# Patient Record
Sex: MALE | Race: WHITE | NOT HISPANIC OR LATINO | Employment: FULL TIME | URBAN - METROPOLITAN AREA
[De-identification: names, ages, dates, MRNs, and addresses within clinical notes are randomized per-mention and may not be internally consistent; named-entity substitution may affect disease eponyms.]

---

## 2019-08-30 ENCOUNTER — TRANSCRIBE ORDERS (OUTPATIENT)
Dept: ADMINISTRATIVE | Facility: HOSPITAL | Age: 44
End: 2019-08-30

## 2019-08-30 DIAGNOSIS — I15.8 SECONDARY DIASTOLIC HYPERTENSION: Primary | ICD-10-CM

## 2019-08-30 DIAGNOSIS — R06.02 SHORTNESS OF BREATH: ICD-10-CM

## 2019-09-27 ENCOUNTER — APPOINTMENT (OUTPATIENT)
Dept: CARDIOLOGY | Facility: HOSPITAL | Age: 44
End: 2019-09-27

## 2019-10-14 ENCOUNTER — HOSPITAL ENCOUNTER (OUTPATIENT)
Dept: CARDIOLOGY | Facility: HOSPITAL | Age: 44
Discharge: HOME | End: 2019-10-14
Admitting: FAMILY MEDICINE

## 2019-10-14 VITALS
BODY MASS INDEX: 34.8 KG/M2 | SYSTOLIC BLOOD PRESSURE: 128 MMHG | HEIGHT: 69 IN | DIASTOLIC BLOOD PRESSURE: 91 MMHG | WEIGHT: 235 LBS | HEART RATE: 74 BPM

## 2019-10-14 DIAGNOSIS — I15.8 SECONDARY DIASTOLIC HYPERTENSION: ICD-10-CM

## 2019-10-14 DIAGNOSIS — R06.02 SHORTNESS OF BREATH: ICD-10-CM

## 2019-10-14 LAB
AORTIC DIMENSIONLESS INDEX: 0.9 (DI)
BH CV ECHO MEAS - ACS: 2.3 CM
BH CV ECHO MEAS - AO MAX PG (FULL): 0.56 MMHG
BH CV ECHO MEAS - AO MAX PG: 4 MMHG
BH CV ECHO MEAS - AO MEAN PG (FULL): 1 MMHG
BH CV ECHO MEAS - AO MEAN PG: 2 MMHG
BH CV ECHO MEAS - AO ROOT AREA (BSA CORRECTED): 1.5
BH CV ECHO MEAS - AO ROOT AREA: 8.6 CM^2
BH CV ECHO MEAS - AO ROOT DIAM: 3.3 CM
BH CV ECHO MEAS - AO V2 MAX: 100 CM/SEC
BH CV ECHO MEAS - AO V2 MEAN: 67.7 CM/SEC
BH CV ECHO MEAS - AO V2 VTI: 18.5 CM
BH CV ECHO MEAS - ASC AORTA: 3.3 CM
BH CV ECHO MEAS - AVA(I,A): 3.5 CM^2
BH CV ECHO MEAS - AVA(I,D): 3.5 CM^2
BH CV ECHO MEAS - AVA(V,A): 3.5 CM^2
BH CV ECHO MEAS - AVA(V,D): 3.5 CM^2
BH CV ECHO MEAS - BSA(HAYCOCK): 2.3 M^2
BH CV ECHO MEAS - BSA: 2.2 M^2
BH CV ECHO MEAS - BZI_BMI: 34.7 KILOGRAMS/M^2
BH CV ECHO MEAS - BZI_METRIC_HEIGHT: 175.3 CM
BH CV ECHO MEAS - BZI_METRIC_WEIGHT: 106.6 KG
BH CV ECHO MEAS - EDV(CUBED): 91.1 ML
BH CV ECHO MEAS - EDV(MOD-SP2): 116 ML
BH CV ECHO MEAS - EDV(MOD-SP4): 150 ML
BH CV ECHO MEAS - EDV(TEICH): 92.4 ML
BH CV ECHO MEAS - EF(CUBED): 70.4 %
BH CV ECHO MEAS - EF(MOD-BP): 54 %
BH CV ECHO MEAS - EF(MOD-SP2): 56 %
BH CV ECHO MEAS - EF(MOD-SP4): 53.3 %
BH CV ECHO MEAS - EF(TEICH): 62.1 %
BH CV ECHO MEAS - ESV(CUBED): 27 ML
BH CV ECHO MEAS - ESV(MOD-SP2): 51 ML
BH CV ECHO MEAS - ESV(MOD-SP4): 70 ML
BH CV ECHO MEAS - ESV(TEICH): 35 ML
BH CV ECHO MEAS - FS: 33.3 %
BH CV ECHO MEAS - IVS/LVPW: 1.1
BH CV ECHO MEAS - IVSD: 1.2 CM
BH CV ECHO MEAS - LAT PEAK E' VEL: 12.1 CM/SEC
BH CV ECHO MEAS - LV DIASTOLIC VOL/BSA (35-75): 67.8 ML/M^2
BH CV ECHO MEAS - LV MASS(C)D: 180.7 GRAMS
BH CV ECHO MEAS - LV MASS(C)DI: 81.7 GRAMS/M^2
BH CV ECHO MEAS - LV MAX PG: 3.4 MMHG
BH CV ECHO MEAS - LV MEAN PG: 1 MMHG
BH CV ECHO MEAS - LV SYSTOLIC VOL/BSA (12-30): 31.6 ML/M^2
BH CV ECHO MEAS - LV V1 MAX: 92.7 CM/SEC
BH CV ECHO MEAS - LV V1 MEAN: 51 CM/SEC
BH CV ECHO MEAS - LV V1 VTI: 17.2 CM
BH CV ECHO MEAS - LVIDD: 4.5 CM
BH CV ECHO MEAS - LVIDS: 3 CM
BH CV ECHO MEAS - LVLD AP2: 9.1 CM
BH CV ECHO MEAS - LVLD AP4: 9.5 CM
BH CV ECHO MEAS - LVLS AP2: 8 CM
BH CV ECHO MEAS - LVLS AP4: 8.4 CM
BH CV ECHO MEAS - LVOT AREA (M): 3.8 CM^2
BH CV ECHO MEAS - LVOT AREA: 3.8 CM^2
BH CV ECHO MEAS - LVOT DIAM: 2.2 CM
BH CV ECHO MEAS - LVPWD: 1.1 CM
BH CV ECHO MEAS - MED PEAK E' VEL: 7.62 CM/SEC
BH CV ECHO MEAS - MV A DUR: 162 SEC
BH CV ECHO MEAS - MV A MAX VEL: 57.8 CM/SEC
BH CV ECHO MEAS - MV DEC SLOPE: 351 CM/SEC^2
BH CV ECHO MEAS - MV DEC TIME: 109 SEC
BH CV ECHO MEAS - MV E MAX VEL: 47.7 CM/SEC
BH CV ECHO MEAS - MV E/A: 0.83
BH CV ECHO MEAS - MV MAX PG: 2.1 MMHG
BH CV ECHO MEAS - MV MEAN PG: 1 MMHG
BH CV ECHO MEAS - MV P1/2T MAX VEL: 61.3 CM/SEC
BH CV ECHO MEAS - MV P1/2T: 51.2 MSEC
BH CV ECHO MEAS - MV V2 MAX: 71.8 CM/SEC
BH CV ECHO MEAS - MV V2 MEAN: 42.6 CM/SEC
BH CV ECHO MEAS - MV V2 VTI: 17 CM
BH CV ECHO MEAS - MVA P1/2T LCG: 3.6 CM^2
BH CV ECHO MEAS - MVA(P1/2T): 4.3 CM^2
BH CV ECHO MEAS - MVA(VTI): 3.8 CM^2
BH CV ECHO MEAS - PA ACC TIME: 0.12 SEC
BH CV ECHO MEAS - PA MAX PG (FULL): 8.4 MMHG
BH CV ECHO MEAS - PA MAX PG: 9.9 MMHG
BH CV ECHO MEAS - PA PR(ACCEL): 25 MMHG
BH CV ECHO MEAS - PA V2 MAX: 157 CM/SEC
BH CV ECHO MEAS - PVA(V,A): 1.8 CM^2
BH CV ECHO MEAS - PVA(V,D): 1.8 CM^2
BH CV ECHO MEAS - QP/QS: 0.96
BH CV ECHO MEAS - RV MAX PG: 1.5 MMHG
BH CV ECHO MEAS - RV MEAN PG: 1 MMHG
BH CV ECHO MEAS - RV V1 MAX: 61.3 CM/SEC
BH CV ECHO MEAS - RV V1 MEAN: 39.6 CM/SEC
BH CV ECHO MEAS - RV V1 VTI: 13.9 CM
BH CV ECHO MEAS - RVOT AREA: 4.5 CM^2
BH CV ECHO MEAS - RVOT DIAM: 2.4 CM
BH CV ECHO MEAS - SI(AO): 71.5 ML/M^2
BH CV ECHO MEAS - SI(CUBED): 29 ML/M^2
BH CV ECHO MEAS - SI(LVOT): 29.6 ML/M^2
BH CV ECHO MEAS - SI(MOD-SP2): 29.4 ML/M^2
BH CV ECHO MEAS - SI(MOD-SP4): 36.2 ML/M^2
BH CV ECHO MEAS - SI(TEICH): 26 ML/M^2
BH CV ECHO MEAS - SV(AO): 158.2 ML
BH CV ECHO MEAS - SV(CUBED): 64.1 ML
BH CV ECHO MEAS - SV(LVOT): 65.4 ML
BH CV ECHO MEAS - SV(MOD-SP2): 65 ML
BH CV ECHO MEAS - SV(MOD-SP4): 80 ML
BH CV ECHO MEAS - SV(RVOT): 62.9 ML
BH CV ECHO MEAS - SV(TEICH): 57.4 ML
BH CV ECHO MEAS - TAPSE (>1.6): 2.7 CM2
BH CV ECHO MEASUREMENTS AVERAGE E/E' RATIO: 4.84
BH CV XLRA - RV BASE: 3.9 CM
BH CV XLRA - TDI S': 12.7 CM/SEC
LEFT ATRIUM VOLUME INDEX: 21 ML/M2
LV EF 2D ECHO EST: 54 %

## 2019-10-14 PROCEDURE — 25010000002 PERFLUTREN (DEFINITY) 8.476 MG IN SODIUM CHLORIDE 0.9 % 10 ML INJECTION: Performed by: FAMILY MEDICINE

## 2019-10-14 PROCEDURE — 93306 TTE W/DOPPLER COMPLETE: CPT | Performed by: INTERNAL MEDICINE

## 2019-10-14 PROCEDURE — 93306 TTE W/DOPPLER COMPLETE: CPT

## 2019-10-14 RX ADMIN — SODIUM CHLORIDE 2 ML: 9 INJECTION INTRAMUSCULAR; INTRAVENOUS; SUBCUTANEOUS at 11:03

## 2020-11-27 ENCOUNTER — TRANSCRIBE ORDERS (OUTPATIENT)
Dept: ADMINISTRATIVE | Facility: HOSPITAL | Age: 45
End: 2020-11-27

## 2020-11-27 DIAGNOSIS — I10 ESSENTIAL HYPERTENSION, MALIGNANT: ICD-10-CM

## 2020-11-27 DIAGNOSIS — R79.82 ELEVATED C-REACTIVE PROTEIN (CRP): Primary | ICD-10-CM

## 2020-12-16 ENCOUNTER — HOSPITAL ENCOUNTER (OUTPATIENT)
Dept: CT IMAGING | Facility: HOSPITAL | Age: 45
Discharge: HOME | End: 2020-12-16
Admitting: FAMILY MEDICINE

## 2020-12-16 DIAGNOSIS — I10 ESSENTIAL HYPERTENSION, MALIGNANT: ICD-10-CM

## 2020-12-16 DIAGNOSIS — R79.82 ELEVATED C-REACTIVE PROTEIN (CRP): ICD-10-CM

## 2020-12-16 PROCEDURE — 75571 CT HRT W/O DYE W/CA TEST: CPT

## 2021-07-30 ENCOUNTER — TRANSCRIBE ORDERS (OUTPATIENT)
Dept: ADMINISTRATIVE | Facility: HOSPITAL | Age: 46
End: 2021-07-30

## 2021-07-30 ENCOUNTER — HOSPITAL ENCOUNTER (OUTPATIENT)
Dept: CARDIOLOGY | Facility: HOSPITAL | Age: 46
Discharge: HOME | End: 2021-07-30
Admitting: PHYSICIAN ASSISTANT

## 2021-07-30 ENCOUNTER — HOSPITAL ENCOUNTER (EMERGENCY)
Facility: HOSPITAL | Age: 46
Discharge: HOME | End: 2021-07-30
Attending: EMERGENCY MEDICINE | Admitting: EMERGENCY MEDICINE

## 2021-07-30 VITALS
OXYGEN SATURATION: 97 % | SYSTOLIC BLOOD PRESSURE: 135 MMHG | RESPIRATION RATE: 18 BRPM | BODY MASS INDEX: 34.7 KG/M2 | TEMPERATURE: 96.9 F | DIASTOLIC BLOOD PRESSURE: 85 MMHG | HEART RATE: 72 BPM | HEIGHT: 69 IN

## 2021-07-30 DIAGNOSIS — R09.89 SUSPECTED DVT (DEEP VEIN THROMBOSIS): Primary | ICD-10-CM

## 2021-07-30 DIAGNOSIS — R09.89 SUSPECTED DVT (DEEP VEIN THROMBOSIS): ICD-10-CM

## 2021-07-30 DIAGNOSIS — I82.401 ACUTE DEEP VEIN THROMBOSIS (DVT) OF RIGHT LOWER EXTREMITY, UNSPECIFIED VEIN (HCC): Primary | ICD-10-CM

## 2021-07-30 LAB
ANION GAP SERPL CALCULATED.3IONS-SCNC: 8.2 MMOL/L (ref 5–15)
BASOPHILS # BLD AUTO: 0.04 10*3/MM3 (ref 0–0.2)
BASOPHILS NFR BLD AUTO: 0.5 % (ref 0–1.5)
BUN SERPL-MCNC: 25 MG/DL (ref 6–20)
BUN/CREAT SERPL: 21.7 (ref 7–25)
CALCIUM SPEC-SCNC: 8.8 MG/DL (ref 8.6–10.5)
CHLORIDE SERPL-SCNC: 105 MMOL/L (ref 98–107)
CO2 SERPL-SCNC: 25.8 MMOL/L (ref 22–29)
CREAT SERPL-MCNC: 1.15 MG/DL (ref 0.76–1.27)
DEPRECATED RDW RBC AUTO: 38.6 FL (ref 37–54)
EOSINOPHIL # BLD AUTO: 0.03 10*3/MM3 (ref 0–0.4)
EOSINOPHIL NFR BLD AUTO: 0.4 % (ref 0.3–6.2)
ERYTHROCYTE [DISTWIDTH] IN BLOOD BY AUTOMATED COUNT: 13 % (ref 12.3–15.4)
GFR SERPL CREATININE-BSD FRML MDRD: 68 ML/MIN/1.73
GLUCOSE SERPL-MCNC: 86 MG/DL (ref 65–99)
HCT VFR BLD AUTO: 47.9 % (ref 37.5–51)
HGB BLD-MCNC: 16 G/DL (ref 13–17.7)
IMM GRANULOCYTES # BLD AUTO: 0.02 10*3/MM3 (ref 0–0.05)
IMM GRANULOCYTES NFR BLD AUTO: 0.3 % (ref 0–0.5)
LYMPHOCYTES # BLD AUTO: 1.44 10*3/MM3 (ref 0.7–3.1)
LYMPHOCYTES NFR BLD AUTO: 18.2 % (ref 19.6–45.3)
MCH RBC QN AUTO: 27.7 PG (ref 26.6–33)
MCHC RBC AUTO-ENTMCNC: 33.4 G/DL (ref 31.5–35.7)
MCV RBC AUTO: 82.9 FL (ref 79–97)
MONOCYTES # BLD AUTO: 0.56 10*3/MM3 (ref 0.1–0.9)
MONOCYTES NFR BLD AUTO: 7.1 % (ref 5–12)
NEUTROPHILS NFR BLD AUTO: 5.83 10*3/MM3 (ref 1.7–7)
NEUTROPHILS NFR BLD AUTO: 73.5 % (ref 42.7–76)
NRBC BLD AUTO-RTO: 0 /100 WBC (ref 0–0.2)
PLATELET # BLD AUTO: 217 10*3/MM3 (ref 140–450)
PMV BLD AUTO: 10.4 FL (ref 6–12)
POTASSIUM SERPL-SCNC: 4.5 MMOL/L (ref 3.5–5.2)
RBC # BLD AUTO: 5.78 10*6/MM3 (ref 4.14–5.8)
SODIUM SERPL-SCNC: 139 MMOL/L (ref 136–145)
WBC # BLD AUTO: 7.92 10*3/MM3 (ref 3.4–10.8)

## 2021-07-30 PROCEDURE — 99283 EMERGENCY DEPT VISIT LOW MDM: CPT

## 2021-07-30 PROCEDURE — 93971 EXTREMITY STUDY: CPT

## 2021-07-30 PROCEDURE — 36415 COLL VENOUS BLD VENIPUNCTURE: CPT

## 2021-07-30 PROCEDURE — 80048 BASIC METABOLIC PNL TOTAL CA: CPT | Performed by: EMERGENCY MEDICINE

## 2021-07-30 PROCEDURE — 85025 COMPLETE CBC W/AUTO DIFF WBC: CPT | Performed by: EMERGENCY MEDICINE

## 2021-07-30 RX ADMIN — APIXABAN 10 MG: 5 TABLET, FILM COATED ORAL at 19:47

## 2021-07-30 NOTE — PROGRESS NOTES
Right lower extremity venous exam was performed. Preliminary findings of acute DVT noted gastroc v and acute SVT noted above knee given to Court Torres PA at 17:40. Patient was given further instructions and sent over to ER.

## 2021-07-31 LAB
BH CV LOW VAS RIGHT GASTRONEMIUS VESSEL: 1
BH CV LOW VAS RIGHT GREATER SAPH AK VESSEL: 1
BH CV LOW VAS RIGHT VARICOSITY AK VESSEL: 1
BH CV LOW VAS RIGHT VARICOSITY BK VESSEL: 1
BH CV LOWER VASCULAR LEFT COMMON FEMORAL AUGMENT: NORMAL
BH CV LOWER VASCULAR LEFT COMMON FEMORAL COMPETENT: NORMAL
BH CV LOWER VASCULAR LEFT COMMON FEMORAL COMPRESS: NORMAL
BH CV LOWER VASCULAR LEFT COMMON FEMORAL PHASIC: NORMAL
BH CV LOWER VASCULAR LEFT COMMON FEMORAL SPONT: NORMAL
BH CV LOWER VASCULAR RIGHT COMMON FEMORAL AUGMENT: NORMAL
BH CV LOWER VASCULAR RIGHT COMMON FEMORAL COMPETENT: NORMAL
BH CV LOWER VASCULAR RIGHT COMMON FEMORAL COMPRESS: NORMAL
BH CV LOWER VASCULAR RIGHT COMMON FEMORAL PHASIC: NORMAL
BH CV LOWER VASCULAR RIGHT COMMON FEMORAL SPONT: NORMAL
BH CV LOWER VASCULAR RIGHT DISTAL FEMORAL COMPRESS: NORMAL
BH CV LOWER VASCULAR RIGHT GASTRONEMIUS COMPRESS: NORMAL
BH CV LOWER VASCULAR RIGHT GASTRONEMIUS THROMBUS: NORMAL
BH CV LOWER VASCULAR RIGHT GREATER SAPH AK COMPRESS: NORMAL
BH CV LOWER VASCULAR RIGHT GREATER SAPH AK THROMBUS: NORMAL
BH CV LOWER VASCULAR RIGHT GREATER SAPH BK COMPRESS: NORMAL
BH CV LOWER VASCULAR RIGHT LESSER SAPH COMPRESS: NORMAL
BH CV LOWER VASCULAR RIGHT MID FEMORAL AUGMENT: NORMAL
BH CV LOWER VASCULAR RIGHT MID FEMORAL COMPETENT: NORMAL
BH CV LOWER VASCULAR RIGHT MID FEMORAL COMPRESS: NORMAL
BH CV LOWER VASCULAR RIGHT MID FEMORAL PHASIC: NORMAL
BH CV LOWER VASCULAR RIGHT MID FEMORAL SPONT: NORMAL
BH CV LOWER VASCULAR RIGHT PERONEAL COMPRESS: NORMAL
BH CV LOWER VASCULAR RIGHT POPLITEAL AUGMENT: NORMAL
BH CV LOWER VASCULAR RIGHT POPLITEAL COMPETENT: NORMAL
BH CV LOWER VASCULAR RIGHT POPLITEAL COMPRESS: NORMAL
BH CV LOWER VASCULAR RIGHT POPLITEAL PHASIC: NORMAL
BH CV LOWER VASCULAR RIGHT POPLITEAL SPONT: NORMAL
BH CV LOWER VASCULAR RIGHT POSTERIOR TIBIAL COMPRESS: NORMAL
BH CV LOWER VASCULAR RIGHT PROFUNDA FEMORAL COMPRESS: NORMAL
BH CV LOWER VASCULAR RIGHT PROXIMAL FEMORAL COMPRESS: NORMAL
BH CV LOWER VASCULAR RIGHT VARICOSITY AK COMPRESS: NORMAL
BH CV LOWER VASCULAR RIGHT VARICOSITY AK THROMBUS: NORMAL
BH CV LOWER VASCULAR RIGHT VARICOSITY BK COMPRESS: NORMAL
BH CV LOWER VASCULAR RIGHT VARICOSITY BK THROMBUS: NORMAL
MAXIMAL PREDICTED HEART RATE: 172 BPM
STRESS TARGET HR: 146 BPM

## 2021-09-02 ENCOUNTER — OFFICE VISIT (OUTPATIENT)
Dept: CARDIOLOGY | Facility: CLINIC | Age: 46
End: 2021-09-02

## 2021-09-02 VITALS
BODY MASS INDEX: 31.81 KG/M2 | HEART RATE: 86 BPM | SYSTOLIC BLOOD PRESSURE: 132 MMHG | DIASTOLIC BLOOD PRESSURE: 82 MMHG | RESPIRATION RATE: 18 BRPM | HEIGHT: 69 IN | WEIGHT: 214.8 LBS

## 2021-09-02 DIAGNOSIS — R09.89 SUSPECTED DVT (DEEP VEIN THROMBOSIS): Primary | ICD-10-CM

## 2021-09-02 DIAGNOSIS — R06.02 SHORTNESS OF BREATH: ICD-10-CM

## 2021-09-02 PROCEDURE — 99204 OFFICE O/P NEW MOD 45 MIN: CPT | Performed by: INTERNAL MEDICINE

## 2021-09-02 RX ORDER — PRASTERONE (DHEA) 25 MG
50 CAPSULE ORAL DAILY
COMMUNITY
Start: 2021-07-23

## 2021-09-02 RX ORDER — OLMESARTAN MEDOXOMIL 40 MG/1
40 TABLET ORAL DAILY
COMMUNITY
Start: 2021-07-13

## 2021-09-02 RX ORDER — AMLODIPINE BESYLATE 10 MG/1
10 TABLET ORAL DAILY
COMMUNITY
Start: 2021-08-20

## 2021-09-02 RX ORDER — LEVOCETIRIZINE DIHYDROCHLORIDE 5 MG/1
5 TABLET, FILM COATED ORAL EVERY EVENING
COMMUNITY
End: 2022-02-21

## 2021-09-02 RX ORDER — MONTELUKAST SODIUM 10 MG/1
10 TABLET ORAL DAILY
COMMUNITY
Start: 2021-08-12

## 2021-09-02 RX ORDER — FLUTICASONE PROPIONATE 50 MCG
2 SPRAY, SUSPENSION (ML) NASAL DAILY
COMMUNITY
Start: 2021-07-13

## 2021-09-02 RX ORDER — ALLOPURINOL 300 MG/1
300 TABLET ORAL DAILY
COMMUNITY

## 2021-09-16 ENCOUNTER — HOSPITAL ENCOUNTER (EMERGENCY)
Facility: HOSPITAL | Age: 46
Discharge: HOME | End: 2021-09-16
Attending: EMERGENCY MEDICINE | Admitting: EMERGENCY MEDICINE

## 2021-09-16 ENCOUNTER — APPOINTMENT (OUTPATIENT)
Dept: GENERAL RADIOLOGY | Facility: HOSPITAL | Age: 46
End: 2021-09-16

## 2021-09-16 VITALS
HEART RATE: 82 BPM | OXYGEN SATURATION: 99 % | TEMPERATURE: 97.3 F | DIASTOLIC BLOOD PRESSURE: 84 MMHG | SYSTOLIC BLOOD PRESSURE: 120 MMHG | RESPIRATION RATE: 16 BRPM | WEIGHT: 212 LBS | BODY MASS INDEX: 31.4 KG/M2 | HEIGHT: 69 IN

## 2021-09-16 DIAGNOSIS — R07.89 ATYPICAL CHEST PAIN: Primary | ICD-10-CM

## 2021-09-16 LAB
ALBUMIN SERPL-MCNC: 4.2 G/DL (ref 3.5–5.2)
ALBUMIN/GLOB SERPL: 1.7 G/DL
ALP SERPL-CCNC: 73 U/L (ref 39–117)
ALT SERPL W P-5'-P-CCNC: 20 U/L (ref 1–41)
ANION GAP SERPL CALCULATED.3IONS-SCNC: 9.6 MMOL/L (ref 5–15)
AST SERPL-CCNC: 13 U/L (ref 1–40)
BASOPHILS # BLD AUTO: 0.03 10*3/MM3 (ref 0–0.2)
BASOPHILS NFR BLD AUTO: 0.5 % (ref 0–1.5)
BILIRUB SERPL-MCNC: 0.6 MG/DL (ref 0–1.2)
BUN SERPL-MCNC: 13 MG/DL (ref 6–20)
BUN/CREAT SERPL: 14.4 (ref 7–25)
CALCIUM SPEC-SCNC: 9.3 MG/DL (ref 8.6–10.5)
CHLORIDE SERPL-SCNC: 103 MMOL/L (ref 98–107)
CO2 SERPL-SCNC: 26.4 MMOL/L (ref 22–29)
CREAT SERPL-MCNC: 0.9 MG/DL (ref 0.76–1.27)
D DIMER PPP FEU-MCNC: <0.27 MCGFEU/ML (ref 0–0.49)
DEPRECATED RDW RBC AUTO: 43.3 FL (ref 37–54)
EOSINOPHIL # BLD AUTO: 0.04 10*3/MM3 (ref 0–0.4)
EOSINOPHIL NFR BLD AUTO: 0.7 % (ref 0.3–6.2)
ERYTHROCYTE [DISTWIDTH] IN BLOOD BY AUTOMATED COUNT: 14.2 % (ref 12.3–15.4)
GFR SERPL CREATININE-BSD FRML MDRD: 90 ML/MIN/1.73
GLOBULIN UR ELPH-MCNC: 2.5 GM/DL
GLUCOSE SERPL-MCNC: 118 MG/DL (ref 65–99)
HCT VFR BLD AUTO: 47.3 % (ref 37.5–51)
HGB BLD-MCNC: 15.6 G/DL (ref 13–17.7)
IMM GRANULOCYTES # BLD AUTO: 0.01 10*3/MM3 (ref 0–0.05)
IMM GRANULOCYTES NFR BLD AUTO: 0.2 % (ref 0–0.5)
LYMPHOCYTES # BLD AUTO: 1.23 10*3/MM3 (ref 0.7–3.1)
LYMPHOCYTES NFR BLD AUTO: 20.9 % (ref 19.6–45.3)
MCH RBC QN AUTO: 27.6 PG (ref 26.6–33)
MCHC RBC AUTO-ENTMCNC: 33 G/DL (ref 31.5–35.7)
MCV RBC AUTO: 83.7 FL (ref 79–97)
MONOCYTES # BLD AUTO: 0.52 10*3/MM3 (ref 0.1–0.9)
MONOCYTES NFR BLD AUTO: 8.8 % (ref 5–12)
NEUTROPHILS NFR BLD AUTO: 4.05 10*3/MM3 (ref 1.7–7)
NEUTROPHILS NFR BLD AUTO: 68.9 % (ref 42.7–76)
NRBC BLD AUTO-RTO: 0 /100 WBC (ref 0–0.2)
NT-PROBNP SERPL-MCNC: 35.1 PG/ML (ref 0–450)
PLATELET # BLD AUTO: 240 10*3/MM3 (ref 140–450)
PMV BLD AUTO: 10.2 FL (ref 6–12)
POTASSIUM SERPL-SCNC: 4.2 MMOL/L (ref 3.5–5.2)
PROT SERPL-MCNC: 6.7 G/DL (ref 6–8.5)
QT INTERVAL: 376 MS
RBC # BLD AUTO: 5.65 10*6/MM3 (ref 4.14–5.8)
SODIUM SERPL-SCNC: 139 MMOL/L (ref 136–145)
TROPONIN T SERPL-MCNC: <0.01 NG/ML (ref 0–0.03)
TROPONIN T SERPL-MCNC: <0.01 NG/ML (ref 0–0.03)
WBC # BLD AUTO: 5.88 10*3/MM3 (ref 3.4–10.8)

## 2021-09-16 PROCEDURE — 93010 ELECTROCARDIOGRAM REPORT: CPT | Performed by: INTERNAL MEDICINE

## 2021-09-16 PROCEDURE — 71045 X-RAY EXAM CHEST 1 VIEW: CPT

## 2021-09-16 PROCEDURE — 80053 COMPREHEN METABOLIC PANEL: CPT | Performed by: EMERGENCY MEDICINE

## 2021-09-16 PROCEDURE — 83880 ASSAY OF NATRIURETIC PEPTIDE: CPT | Performed by: EMERGENCY MEDICINE

## 2021-09-16 PROCEDURE — 84484 ASSAY OF TROPONIN QUANT: CPT | Performed by: EMERGENCY MEDICINE

## 2021-09-16 PROCEDURE — 99284 EMERGENCY DEPT VISIT MOD MDM: CPT

## 2021-09-16 PROCEDURE — 85025 COMPLETE CBC W/AUTO DIFF WBC: CPT | Performed by: EMERGENCY MEDICINE

## 2021-09-16 PROCEDURE — 85379 FIBRIN DEGRADATION QUANT: CPT | Performed by: EMERGENCY MEDICINE

## 2021-09-16 PROCEDURE — 93005 ELECTROCARDIOGRAM TRACING: CPT | Performed by: EMERGENCY MEDICINE

## 2021-09-16 RX ORDER — SODIUM CHLORIDE 0.9 % (FLUSH) 0.9 %
10 SYRINGE (ML) INJECTION AS NEEDED
Status: DISCONTINUED | OUTPATIENT
Start: 2021-09-16 | End: 2021-09-16 | Stop reason: HOSPADM

## 2021-09-16 NOTE — ED NOTES
Pt presents to ED with complains of CP since 0530 today. Pt reports hx of DVT two months ago, and was placed on blood thinners. Pt denies SOA at this time. Pt is A&OX4, able to ambulate, and in a mask at this time. Pt reports he took tums with no improvement.     Patient was placed in face mask during first look triage.  Patient was wearing a face mask throughout encounter.  I wore personal protective equipment throughout the encounter.  Hand hygiene was performed before and after patient encounter.       Elvira Abbott, RN  09/16/21 [id number]

## 2021-09-16 NOTE — ED PROVIDER NOTES
EMERGENCY DEPARTMENT ENCOUNTER    Room Number:  14/14  Date of encounter:  9/16/2021  PCP: Reyes, Rosenberg Acosta, MD  Patient Care Team:  Reyes, Rosenberg Acosta, MD as PCP - General (Family Medicine)   Historian: Patient    HPI:  Chief Complaint: Chest pain  A complete HPI/ROS/PMH/PSH/SH/FH are unobtainable due to: Nothing    Context: Elijah Mo is a 46 y.o. male who presents to the ED c/o chest pain that started around 530 this morning.  He reports it is in the center of his chest and sharp.  It is constant.  Nothing makes it worse or better.  He has not had prior similar episodes.  He reports he took Tums around 8:30 in the morning without improvement.  He states he is currently on Eliquis for a blood clot in his right leg that was diagnosed about a month and a half ago.  He denies shortness of breath.  He denies nausea and vomiting.  He denies diaphoresis.  He reports his pain is moderate in severity.    Prior record review: ER visit for right leg pain 7/30/2021.  Diagnosed with DVT.    PAST MEDICAL HISTORY  Active Ambulatory Problems     Diagnosis Date Noted   • No Active Ambulatory Problems     Resolved Ambulatory Problems     Diagnosis Date Noted   • No Resolved Ambulatory Problems     No Additional Past Medical History       The patient has a COVID HM Topic on their chart, and they are fully vaccinated.    PAST SURGICAL HISTORY  No past surgical history on file.      FAMILY HISTORY  Family History   Problem Relation Age of Onset   • Heart attack Father          SOCIAL HISTORY  Social History     Socioeconomic History   • Marital status:      Spouse name: Not on file   • Number of children: Not on file   • Years of education: Not on file   • Highest education level: Not on file   Tobacco Use   • Smoking status: Never Smoker   Substance and Sexual Activity   • Alcohol use: Yes     Comment: occasional          ALLERGIES  Patient has no known allergies.        REVIEW OF SYSTEMS  Review of  Systems   Positive chest pain, no shortness of breath, no nausea, no vomiting, and no diaphoresis, no syncope, no palpitations, no abdominal pain  All systems reviewed and negative except for those discussed in HPI.       PHYSICAL EXAM    I have reviewed the triage vital signs and nursing notes.    ED Triage Vitals   Temp Heart Rate Resp BP SpO2   09/16/21 0941 09/16/21 0941 09/16/21 0941 09/16/21 0943 09/16/21 0941   97.3 °F (36.3 °C) 89 18 133/85 97 %      Temp src Heart Rate Source Patient Position BP Location FiO2 (%)   -- 09/16/21 0941 -- -- --    Monitor          Physical Exam  GENERAL: Awake, alert, no acute distress  SKIN: Warm, dry  HENT: Normocephalic, atraumatic  EYES: no scleral icterus  CV: regular rhythm, regular rate  RESPIRATORY: normal effort, lungs clear  ABDOMEN: soft, nontender, nondistended  MUSCULOSKELETAL: no deformity  NEURO: alert, moves all extremities, follows commands          LAB RESULTS  Recent Results (from the past 24 hour(s))   ECG 12 Lead    Collection Time: 09/16/21  9:47 AM   Result Value Ref Range    QT Interval 376 ms   Comprehensive Metabolic Panel    Collection Time: 09/16/21  9:55 AM    Specimen: Blood   Result Value Ref Range    Glucose 118 (H) 65 - 99 mg/dL    BUN 13 6 - 20 mg/dL    Creatinine 0.90 0.76 - 1.27 mg/dL    Sodium 139 136 - 145 mmol/L    Potassium 4.2 3.5 - 5.2 mmol/L    Chloride 103 98 - 107 mmol/L    CO2 26.4 22.0 - 29.0 mmol/L    Calcium 9.3 8.6 - 10.5 mg/dL    Total Protein 6.7 6.0 - 8.5 g/dL    Albumin 4.20 3.50 - 5.20 g/dL    ALT (SGPT) 20 1 - 41 U/L    AST (SGOT) 13 1 - 40 U/L    Alkaline Phosphatase 73 39 - 117 U/L    Total Bilirubin 0.6 0.0 - 1.2 mg/dL    eGFR Non African Amer 90 >60 mL/min/1.73    Globulin 2.5 gm/dL    A/G Ratio 1.7 g/dL    BUN/Creatinine Ratio 14.4 7.0 - 25.0    Anion Gap 9.6 5.0 - 15.0 mmol/L   Troponin    Collection Time: 09/16/21  9:55 AM    Specimen: Blood   Result Value Ref Range    Troponin T <0.010 0.000 - 0.030 ng/mL   BNP     Collection Time: 09/16/21  9:55 AM    Specimen: Blood   Result Value Ref Range    proBNP 35.1 0.0 - 450.0 pg/mL   D-dimer, Quantitative    Collection Time: 09/16/21  9:55 AM    Specimen: Blood   Result Value Ref Range    D-Dimer, Quantitative <0.27 0.00 - 0.49 MCGFEU/mL   CBC Auto Differential    Collection Time: 09/16/21  9:55 AM    Specimen: Blood   Result Value Ref Range    WBC 5.88 3.40 - 10.80 10*3/mm3    RBC 5.65 4.14 - 5.80 10*6/mm3    Hemoglobin 15.6 13.0 - 17.7 g/dL    Hematocrit 47.3 37.5 - 51.0 %    MCV 83.7 79.0 - 97.0 fL    MCH 27.6 26.6 - 33.0 pg    MCHC 33.0 31.5 - 35.7 g/dL    RDW 14.2 12.3 - 15.4 %    RDW-SD 43.3 37.0 - 54.0 fl    MPV 10.2 6.0 - 12.0 fL    Platelets 240 140 - 450 10*3/mm3    Neutrophil % 68.9 42.7 - 76.0 %    Lymphocyte % 20.9 19.6 - 45.3 %    Monocyte % 8.8 5.0 - 12.0 %    Eosinophil % 0.7 0.3 - 6.2 %    Basophil % 0.5 0.0 - 1.5 %    Immature Grans % 0.2 0.0 - 0.5 %    Neutrophils, Absolute 4.05 1.70 - 7.00 10*3/mm3    Lymphocytes, Absolute 1.23 0.70 - 3.10 10*3/mm3    Monocytes, Absolute 0.52 0.10 - 0.90 10*3/mm3    Eosinophils, Absolute 0.04 0.00 - 0.40 10*3/mm3    Basophils, Absolute 0.03 0.00 - 0.20 10*3/mm3    Immature Grans, Absolute 0.01 0.00 - 0.05 10*3/mm3    nRBC 0.0 0.0 - 0.2 /100 WBC   Troponin    Collection Time: 09/16/21 12:07 PM    Specimen: Blood   Result Value Ref Range    Troponin T <0.010 0.000 - 0.030 ng/mL       Ordered the above labs and independently reviewed the results.        RADIOLOGY  XR Chest 1 View    Result Date: 9/16/2021  ONE-VIEW PORTABLE CHEST  HISTORY: Chest pain.  FINDINGS:  The lungs are well-expanded and clear and the heart and hilar structures are within normal limits. There is no acute disease.          I ordered the above noted radiological studies. Reviewed by me and discussed with radiologist.  See dictation for official radiology interpretation.      PROCEDURES    Procedures      MEDICATIONS GIVEN IN ER    Medications   sodium chloride  0.9 % flush 10 mL (has no administration in time range)         PROGRESS, DATA ANALYSIS, CONSULTS, AND MEDICAL DECISION MAKING    All labs have been independently reviewed by me.  All radiology studies have been reviewed by me and discussed with radiologist dictating the report.   EKG's independently viewed and interpreted by me.  Discussion below represents my analysis of pertinent findings related to patient's condition, differential diagnosis, treatment plan and final disposition.    Differential diagnosis includes but is not limited to STEMI, non-STEMI, unstable angina, chest wall pain, pulmonary embolus, aortic injury, noncardiac chest pain.    ED Course as of Sep 16 1238   Thu Sep 16, 2021   0949 EKG          EKG time: 947  Rhythm/Rate: Normal sinus, rate 82  P waves and SC: Normal P, normal SC  QRS, axis: Narrow QRS, left axis  ST and T waves: Normal ST and T waves    Interpreted Contemporaneously by me, independently viewed  No prior EKG available for comparison      [TR]   1151 I reviewed work-up findings with the patient.  Answered all questions.  Pending second troponin.  He is agreeable.    [TR]   1154 Heart Score Calculation:History slightly suspicious: 0History moderately suspicious: +1History highly suspicious: +2EKG normal: 0EKG nonspecific repolarization disturbance: +1EKG significant ST deviation: +2Age less than 45: 0Age 45-64: +1Age greater than 65: +2No known risk factors: 01-2 risk factors: +1Greater than 3 risk factors: +2Initial troponin less than the normal limit: 0Initial troponin I-III times normal limit: +1Initial troponin greater than 3 times normal limit: +2Total score: 2    [TR]      ED Course User Index  [TR] Gabino Gold MD           PPE: Both the patient and I wore a CAPR mask throughout the entire patient encounter. I wore protective goggles.       AS OF 12:38 EDT VITALS:    BP - 125/85  HR - 75  TEMP - 97.3 °F (36.3 °C)  O2 SATS - 97%        DIAGNOSIS  Final diagnoses:    Atypical chest pain         DISPOSITION  ED Disposition     ED Disposition Condition Comment    Discharge Stable                 Gabino Gold MD  09/16/21 [id number]

## 2021-09-16 NOTE — DISCHARGE INSTRUCTIONS
Turn here immediately for worsened or changed chest pain.  Follow-up with the cardiologist.  Call for an appointment to be seen soon.

## 2021-09-27 ENCOUNTER — TRANSCRIBE ORDERS (OUTPATIENT)
Dept: ADMINISTRATIVE | Facility: HOSPITAL | Age: 46
End: 2021-09-27

## 2021-09-27 DIAGNOSIS — I82.401 ACUTE EMBOLISM AND THROMBOSIS OF DEEP VEIN OF RIGHT LOWER EXTREMITY (HCC): Primary | ICD-10-CM

## 2021-10-18 NOTE — PROGRESS NOTES
Cardiology clinic note  Nazario Tobin MD, PhD  Subjective:     Encounter Date:09/02/2021      Patient ID: Elijah Mo is a 46 y.o. male.    Chief Complaint:  No chief complaint on file.  New patient encounter  DVT    HPI:  History of Present Illness  I the pleasure to see this 46-year-old gentleman today for history of DVT, he also has essential hypertension, last echo was in October 2019 and stress test some 10 years ago was unremarkable.  Family history of CAD with father passed away at age 52 of MI, mother with hypertension.  Medicines consist of Eliquis for DVT, on losartan and amlodipine for afterload reduction with essential hypertension.  He previously presented to the ER with right leg pain found to have DVT which appeared to be unprovoked.  2D echo revealed EF of 54 to 55% with normal global and regional function, no significant valvular abnormality other than trace to mild regurgitation as described, no valvular stenosis, normal myocardial thickness.  No history of any arrhythmia he is otherwise asymptomatic today.  We did discuss diet and exercise per AHA guidelines, cardiovascular fitness metrics, blood pressure and lipid goals as well as anticoagulation goals for Eliquis with unprovoked DVT.    Review of systems negative x14 point review of systems except as mentioned above    Historical data copied forward from previous encounters in EMR is unchanged      Assessment plan per my encounter  1.  DVT  Continue Eliquis 5 twice daily out for at least 6 months to 1 year  Recommended work-up with possible hematology consult    Blood pressure controlled on present therapy with history of essential hypertension  132/82  Continue ARB and calcium channel blocker    Primary ventricles for CAD with family history  Fast lipid panel on next visit for ASCVD risk calculation    No restrictions from CV standpoint at this time    No indications for ischemic evaluation with asymptomatic patient with normal  "EF    Back to clinic in 6 months, consider taking off anticoagulation at that time or transition to prophylactic dose 2.5 twice daily    Nazario Tobin MD, PhD  The following portions of the patient's history were reviewed and updated as appropriate: allergies, current medications, past family history, past medical history, past social history, past surgical history and problem list.    Problem List:  There is no problem list on file for this patient.      Past Medical History:  History reviewed. No pertinent past medical history.    Past Surgical History:  History reviewed. No pertinent surgical history.    Social History:  Social History     Socioeconomic History   • Marital status:    Tobacco Use   • Smoking status: Never Smoker   Substance and Sexual Activity   • Alcohol use: Yes     Comment: occasional        Allergies:  No Known Allergies    Immunizations:    There is no immunization history on file for this patient.    ROS:  ROS       Objective:         /82 (BP Location: Left arm, Patient Position: Sitting)   Pulse 86   Resp 18   Ht 175.3 cm (69\")   Wt 97.4 kg (214 lb 12.8 oz)   BMI 31.72 kg/m²     Physical Exam    In-Office Procedure(s):  Procedures    ASCVD RIsk Score::  The ASCVD Risk score (Dunlo MYKEL Jr., et al., 2013) failed to calculate for the following reasons:    Cannot find a previous HDL lab    Cannot find a previous total cholesterol lab    Recent Radiology:  Imaging Results (Most Recent)     None          Lab Review:   Hospital Outpatient Visit on 07/30/2021   Component Date Value   • Target HR (85%) 07/30/2021 146    • Max. Pred. HR (100%) 07/30/2021 172    • Right Gastronemius Vessel 07/30/2021 1    • Right Greater Saph AK Ve* 07/30/2021 1    • Right Varicosity AK Vess* 07/30/2021 1    • Right Varicosity BK Vess* 07/30/2021 1    • Right Common Femoral Spo* 07/30/2021 Y    • Right Common Femoral Pha* 07/30/2021 Y    • Right Common Femoral Aug* 07/30/2021 Y    • Right Common " Femoral Com* 07/30/2021 Y    • Right Common Femoral Com* 07/30/2021 C    • Right Profunda Femoral C* 07/30/2021 C    • Right Proximal Femoral C* 07/30/2021 C    • Right Mid Femoral Spont 07/30/2021 Y    • Right Mid Femoral Phasic 07/30/2021 Y    • Right Mid Femoral Augment 07/30/2021 Y    • Right Mid Femoral Compet* 07/30/2021 Y    • Right Mid Femoral Compre* 07/30/2021 C    • Right Distal Femoral Com* 07/30/2021 C    • Right Popliteal Spont 07/30/2021 Y    • Right Popliteal Phasic 07/30/2021 Y    • Right Popliteal Augment 07/30/2021 Y    • Right Popliteal Competent 07/30/2021 Y    • Right Popliteal Compress 07/30/2021 C    • Right Posterior Tibial C* 07/30/2021 C    • Right Peroneal Compress 07/30/2021 C    • Right Gastronemius Compr* 07/30/2021 P    • Right Gastronemius Throm* 07/30/2021 A    • Right Greater Saph AK Co* 07/30/2021 N    • Right Greater Saph AK Th* 07/30/2021 A    • Right Greater Saph BK Co* 07/30/2021 C    • Right Lesser Saph Compre* 07/30/2021 C    • Right Varicosity AK Comp* 07/30/2021 N    • Right Varicosity AK Thro* 07/30/2021 A    • Right Varicosity BK Comp* 07/30/2021 N    • Right Varicosity BK Thro* 07/30/2021 A    • Left Common Femoral Spont 07/30/2021 Y    • Left Common Femoral Phas* 07/30/2021 Y    • Left Common Femoral Augm* 07/30/2021 Y    • Left Common Femoral Comp* 07/30/2021 Y    • Left Common Femoral Comp* 07/30/2021 C    Admission on 07/30/2021, Discharged on 07/30/2021   Component Date Value   • Glucose 07/30/2021 86    • BUN 07/30/2021 25*   • Creatinine 07/30/2021 1.15    • Sodium 07/30/2021 139    • Potassium 07/30/2021 4.5    • Chloride 07/30/2021 105    • CO2 07/30/2021 25.8    • Calcium 07/30/2021 8.8    • eGFR Non  Amer 07/30/2021 68    • BUN/Creatinine Ratio 07/30/2021 21.7    • Anion Gap 07/30/2021 8.2    • WBC 07/30/2021 7.92    • RBC 07/30/2021 5.78    • Hemoglobin 07/30/2021 16.0    • Hematocrit 07/30/2021 47.9    • MCV 07/30/2021 82.9    • MCH 07/30/2021 27.7     • MCHC 07/30/2021 33.4    • RDW 07/30/2021 13.0    • RDW-SD 07/30/2021 38.6    • MPV 07/30/2021 10.4    • Platelets 07/30/2021 217    • Neutrophil % 07/30/2021 73.5    • Lymphocyte % 07/30/2021 18.2*   • Monocyte % 07/30/2021 7.1    • Eosinophil % 07/30/2021 0.4    • Basophil % 07/30/2021 0.5    • Immature Grans % 07/30/2021 0.3    • Neutrophils, Absolute 07/30/2021 5.83    • Lymphocytes, Absolute 07/30/2021 1.44    • Monocytes, Absolute 07/30/2021 0.56    • Eosinophils, Absolute 07/30/2021 0.03    • Basophils, Absolute 07/30/2021 0.04    • Immature Grans, Absolute 07/30/2021 0.02    • nRBC 07/30/2021 0.0                 Assessment:         No diagnosis found.       Plan:            Level of Care:                 Nazario Tobin MD  10/18/21  .

## 2022-02-21 ENCOUNTER — OFFICE VISIT (OUTPATIENT)
Dept: CARDIOLOGY | Facility: CLINIC | Age: 47
End: 2022-02-21

## 2022-02-21 VITALS
WEIGHT: 235 LBS | RESPIRATION RATE: 18 BRPM | BODY MASS INDEX: 34.8 KG/M2 | DIASTOLIC BLOOD PRESSURE: 76 MMHG | HEIGHT: 69 IN | HEART RATE: 96 BPM | SYSTOLIC BLOOD PRESSURE: 104 MMHG

## 2022-02-21 DIAGNOSIS — R09.89 SUSPECTED DVT (DEEP VEIN THROMBOSIS): Primary | ICD-10-CM

## 2022-02-21 DIAGNOSIS — R06.02 SHORTNESS OF BREATH: ICD-10-CM

## 2022-02-21 PROCEDURE — 99214 OFFICE O/P EST MOD 30 MIN: CPT | Performed by: INTERNAL MEDICINE

## 2022-02-21 NOTE — PROGRESS NOTES
Cardiology Clinic Note  Nazario Tobin MD, PhD    Subjective:     Encounter Date:02/21/2022      Patient ID: Elijah Mo is a 46 y.o. male.    Chief Complaint:  Chief Complaint   Patient presents with   • Follow-up       HPI:    Previously  I the pleasure to see this 46-year-old gentleman today for history of DVT, he also has essential hypertension, last echo was in October 2019 and stress test some 10 years ago was unremarkable.  Family history of CAD with father passed away at age 52 of MI, mother with hypertension.  Medicines consist of Eliquis for DVT, on losartan and amlodipine for afterload reduction with essential hypertension.  He previously presented to the ER with right leg pain found to have DVT which appeared to be unprovoked.  2D echo revealed EF of 54 to 55% with normal global and regional function, no significant valvular abnormality other than trace to mild regurgitation as described, no valvular stenosis, normal myocardial thickness.  No history of any arrhythmia he is otherwise asymptomatic today.  We did discuss diet and exercise per AHA guidelines, cardiovascular fitness metrics, blood pressure and lipid goals as well as anticoagulation goals for Eliquis with unprovoked DVT.  No new symptoms today     Review of systems negative x14 point review of systems except as mentioned above     Historical data copied forward from previous encounters in EMR is unchanged        Assessment plan per my encounter  1.  DVT  Continue Eliquis 5 twice daily until finished his present prescription then changed 2.5 twice daily preventative maintenance dose, still has a cord in the right lower extremity lower thigh area, had knee trauma as a provoked event  Recommended work-up with possible hematology consult if recurrent     Blood pressure controlled on present therapy with history of essential hypertension  104 systolic, decrease amlodipine if systolics are running routinely less than 110  Continue ARB and  "calcium channel blocker presently     Primary prevention goals for CAD with family history  Fast lipid panel on next visit for ASCVD risk calculation     No restrictions from CV standpoint at this time     No indications for ischemic evaluation with asymptomatic patient with normal EF     Back to clinic in 6 months, consider taking off anticoagulation at that time or transition to prophylactic dose 2.5 twice daily     Nazario Tobin MD, PhD    Historical data copied forward from previous encounters in EMR including the history, exam, and assessment/plan has been reviewed and is unchanged unless noted otherwise.    Cardiac medicines reviewed with risk, benefits, and necessity of each discussed.    Risk and benefit of cardiac testing reviewed including death heart attack stroke pain bleeding infection need for vascular /cardiovascular surgery were discussed and the patient     Objective:         /76 (BP Location: Left arm, Patient Position: Sitting)   Pulse 96   Resp 18   Ht 175.3 cm (69\")   Wt 107 kg (235 lb)   BMI 34.70 kg/m²           The pleasure to be involved in this patient's cardiovascular care.  Please call with any questions or concerns  Nazario Tobin MD, PhD    Most recent EKG as reviewed and interpreted by me:  Procedures     Most recent echo as reviewed and interpreted by me:  Results for orders placed during the hospital encounter of 10/14/19    Adult Transthoracic Echo Complete W/ Cont if Necessary Per Protocol    Interpretation Summary  · Estimated EF = 54%.      Most recent stress test as reviewed and interpreted by me:      Most recent cardiac catheterization as reviewed interpreted by me:  No results found for this or any previous visit.    The following portions of the patient's history were reviewed and updated as appropriate: allergies, current medications, past family history, past medical history, past social history, past surgical history and problem list.      ROS:  14 point " review of systems negative except as mentioned above    Current Outpatient Medications:   •  5-Hydroxytryptophan (5-HTP PO), Daily., Disp: , Rfl:   •  allopurinol (ZYLOPRIM) 300 MG tablet, Take 300 mg by mouth Daily., Disp: , Rfl:   •  amLODIPine (NORVASC) 10 MG tablet, Take 10 mg by mouth Daily., Disp: , Rfl:   •  Cholecalciferol (Vitamin D-3) 125 MCG (5000 UT) tablet, 10,000 mcg Daily., Disp: , Rfl:   •  DHEA 25 MG capsule, 50 mg Daily., Disp: , Rfl:   •  fluticasone (FLONASE) 50 MCG/ACT nasal spray, 2 sprays by Each Nare route Daily., Disp: , Rfl:   •  montelukast (SINGULAIR) 10 MG tablet, Take 10 mg by mouth Daily., Disp: , Rfl:   •  olmesartan (BENICAR) 40 MG tablet, Take 40 mg by mouth Daily., Disp: , Rfl:     Problem List:  There is no problem list on file for this patient.    Past Medical History:  No past medical history on file.  Past Surgical History:  No past surgical history on file.  Social History:  Social History     Socioeconomic History   • Marital status:    Tobacco Use   • Smoking status: Never Smoker   Substance and Sexual Activity   • Alcohol use: Yes     Comment: occasional      Allergies:  No Known Allergies  Immunizations:    There is no immunization history on file for this patient.         In-Office Procedure(s):  No orders to display        ASCVD RIsk Score::  The ASCVD Risk score (Pierron DC Jr., et al., 2013) failed to calculate for the following reasons:    Cannot find a previous HDL lab    Cannot find a previous total cholesterol lab    Imaging:    Results for orders placed during the hospital encounter of 09/16/21    XR Chest 1 View    Narrative  ONE-VIEW PORTABLE CHEST    HISTORY: Chest pain.    FINDINGS:  The lungs are well-expanded and clear and the heart and hilar  structures are within normal limits. There is no acute disease.    This report was finalized on 9/16/2021 1:54 PM by Dr. Kristian Matthews M.D.       Results for orders placed during the hospital encounter of  12/16/20    CT Cardiac Calcium Score Without Dye    Narrative  CORONARY ARTERY CALCIUM SCORING    HISTORY:  45-year-old male. Coronary artery calcium screening.    TECHNIQUE: Radiation dose reduction techniques were utilized, including  automated exposure control and exposure modulation based on body size.  Regions of interest delineated all areas of coronary artery  calcification on an ECG-gated noncontrasted cardiac CT scan.    FINDINGS: These regions were quantitatively scored for calcium as  follows:    Score 1: LM -- Calcium Score: -, Volume(mm3): -    Score 2: RCA -- Calcium Score: 0, Volume(mm3): 0    Score 3: LAD -- Calcium Score: 0, Volume(mm3): 0    Score 4: CX -- Calcium Score: 0, Volume(mm3): 0    Score 5: PDA -- Calcium Score: -, Volume(mm3): -    Score 6: Diagonal -- Calcium Score: -, Volume(mm3): -    Total: Calcium Score: 0, Volume(mm3): 0    Impression  Total coronary artery Agatston calcification score is 0.    These findings represent no identifiable coronary atherosclerotic  burden.    A score of < 10 represents only a minimal coronary atherosclerotic  burden with a negative predictive value of 90-95%.    A score of  represents a mild coronary atherosclerotic burden with  a relative risk of death of 1.6 as compared to individuals with a score  of < 10.  The risk is higher if this score is > 75% for this age group  or if there is > 1 calcified vessel.    A score of 101-400 represents a moderate coronary atherosclerotic burden  with a relative risk of death of 1.7 as compared to individuals with a  score of < 10.  The risk is higher if this score is > 75% for this age  group or if there is > 1 calcified vessel.    A score of 401-1000 represents a large coronary atherosclerotic burden  with a relative risk of death of 2.5 as compared to individuals with a  score of < 10.  The risk is higher if this score is > 75% for this age  group or if there is > 1 calcified vessel.    A score of > 1000  represents an extensive coronary atherosclerotic  burden with a relative risk of death of 4 as compared to individuals  with a score of < 10.  The risk is higher if this score is > 75% for  this age group or if there is > 1 calcified vessel.    There are no pulmonary airspace opacities, effusions, or lymphadenopathy  within the visualized chest.    Dr. Jemma Crespo M.D reviewed the images and diagnostic data and  performed the interpretation.    This report was finalized on 12/16/2020 4:21 PM by Dr. Jemma Crespo M.D.      Results for orders placed during the hospital encounter of 12/16/20    CT Cardiac Calcium Score Without Dye    Narrative  CORONARY ARTERY CALCIUM SCORING    HISTORY:  45-year-old male. Coronary artery calcium screening.    TECHNIQUE: Radiation dose reduction techniques were utilized, including  automated exposure control and exposure modulation based on body size.  Regions of interest delineated all areas of coronary artery  calcification on an ECG-gated noncontrasted cardiac CT scan.    FINDINGS: These regions were quantitatively scored for calcium as  follows:    Score 1: LM -- Calcium Score: -, Volume(mm3): -    Score 2: RCA -- Calcium Score: 0, Volume(mm3): 0    Score 3: LAD -- Calcium Score: 0, Volume(mm3): 0    Score 4: CX -- Calcium Score: 0, Volume(mm3): 0    Score 5: PDA -- Calcium Score: -, Volume(mm3): -    Score 6: Diagonal -- Calcium Score: -, Volume(mm3): -    Total: Calcium Score: 0, Volume(mm3): 0    Impression  Total coronary artery Agatston calcification score is 0.    These findings represent no identifiable coronary atherosclerotic  burden.    A score of < 10 represents only a minimal coronary atherosclerotic  burden with a negative predictive value of 90-95%.    A score of  represents a mild coronary atherosclerotic burden with  a relative risk of death of 1.6 as compared to individuals with a score  of < 10.  The risk is higher if this score is > 75% for this age group  or  if there is > 1 calcified vessel.    A score of 101-400 represents a moderate coronary atherosclerotic burden  with a relative risk of death of 1.7 as compared to individuals with a  score of < 10.  The risk is higher if this score is > 75% for this age  group or if there is > 1 calcified vessel.    A score of 401-1000 represents a large coronary atherosclerotic burden  with a relative risk of death of 2.5 as compared to individuals with a  score of < 10.  The risk is higher if this score is > 75% for this age  group or if there is > 1 calcified vessel.    A score of > 1000 represents an extensive coronary atherosclerotic  burden with a relative risk of death of 4 as compared to individuals  with a score of < 10.  The risk is higher if this score is > 75% for  this age group or if there is > 1 calcified vessel.    There are no pulmonary airspace opacities, effusions, or lymphadenopathy  within the visualized chest.    Dr. Jemma Crespo M.D reviewed the images and diagnostic data and  performed the interpretation.    This report was finalized on 12/16/2020 4:21 PM by Dr. Jemma Crespo M.D.      Lab Review:   Admission on 09/16/2021, Discharged on 09/16/2021   Component Date Value   • QT Interval 09/16/2021 376    • Glucose 09/16/2021 118*   • BUN 09/16/2021 13    • Creatinine 09/16/2021 0.90    • Sodium 09/16/2021 139    • Potassium 09/16/2021 4.2    • Chloride 09/16/2021 103    • CO2 09/16/2021 26.4    • Calcium 09/16/2021 9.3    • Total Protein 09/16/2021 6.7    • Albumin 09/16/2021 4.20    • ALT (SGPT) 09/16/2021 20    • AST (SGOT) 09/16/2021 13    • Alkaline Phosphatase 09/16/2021 73    • Total Bilirubin 09/16/2021 0.6    • eGFR Non African Amer 09/16/2021 90    • Globulin 09/16/2021 2.5    • A/G Ratio 09/16/2021 1.7    • BUN/Creatinine Ratio 09/16/2021 14.4    • Anion Gap 09/16/2021 9.6    • Troponin T 09/16/2021 <0.010    • Troponin T 09/16/2021 <0.010    • proBNP 09/16/2021 35.1    • D-Dimer, Quantitative  09/16/2021 <0.27    • WBC 09/16/2021 5.88    • RBC 09/16/2021 5.65    • Hemoglobin 09/16/2021 15.6    • Hematocrit 09/16/2021 47.3    • MCV 09/16/2021 83.7    • MCH 09/16/2021 27.6    • MCHC 09/16/2021 33.0    • RDW 09/16/2021 14.2    • RDW-SD 09/16/2021 43.3    • MPV 09/16/2021 10.2    • Platelets 09/16/2021 240    • Neutrophil % 09/16/2021 68.9    • Lymphocyte % 09/16/2021 20.9    • Monocyte % 09/16/2021 8.8    • Eosinophil % 09/16/2021 0.7    • Basophil % 09/16/2021 0.5    • Immature Grans % 09/16/2021 0.2    • Neutrophils, Absolute 09/16/2021 4.05    • Lymphocytes, Absolute 09/16/2021 1.23    • Monocytes, Absolute 09/16/2021 0.52    • Eosinophils, Absolute 09/16/2021 0.04    • Basophils, Absolute 09/16/2021 0.03    • Immature Grans, Absolute 09/16/2021 0.01    • nRBC 09/16/2021 0.0      Recent labs reviewed and interpreted for clinical significance and application            Level of Care:           Nazario Tobin MD  02/21/22  .

## 2022-08-22 ENCOUNTER — OFFICE VISIT (OUTPATIENT)
Dept: CARDIOLOGY | Facility: CLINIC | Age: 47
End: 2022-08-22

## 2022-08-22 VITALS
SYSTOLIC BLOOD PRESSURE: 144 MMHG | BODY MASS INDEX: 35.1 KG/M2 | HEIGHT: 69 IN | HEART RATE: 90 BPM | WEIGHT: 237 LBS | OXYGEN SATURATION: 99 % | DIASTOLIC BLOOD PRESSURE: 86 MMHG

## 2022-08-22 DIAGNOSIS — I10 BENIGN ESSENTIAL HTN: Primary | ICD-10-CM

## 2022-08-22 DIAGNOSIS — R58 BLEEDING: ICD-10-CM

## 2022-08-22 PROCEDURE — 93000 ELECTROCARDIOGRAM COMPLETE: CPT | Performed by: INTERNAL MEDICINE

## 2022-08-22 PROCEDURE — 99214 OFFICE O/P EST MOD 30 MIN: CPT | Performed by: INTERNAL MEDICINE

## 2022-08-22 NOTE — PROGRESS NOTES
Cardiology Clinic Note  Nazario Tobin MD, PhD    Subjective:     Encounter Date:08/22/2022      Patient ID: Elijah Mo is a 47 y.o. male.    Chief Complaint:  Chief Complaint   Patient presents with   • Hypertension   • Follow-up       HPI:       Previously  I the pleasure to see this 46-year-old gentleman today for history of DVT, he also has essential hypertension, last echo was in October 2019 and stress test some 10 years ago was unremarkable.  Family history of CAD with father passed away at age 52 of MI, mother with hypertension.  Medicines consist of Eliquis for DVT, on losartan and amlodipine for afterload reduction with essential hypertension.  He previously presented to the ER with right leg pain found to have DVT which appeared to be unprovoked.  2D echo revealed EF of 54 to 55% with normal global and regional function, no significant valvular abnormality other than trace to mild regurgitation as described, no valvular stenosis, normal myocardial thickness.  No history of any arrhythmia he is otherwise asymptomatic today.  We did discuss diet and exercise per AHA guidelines, cardiovascular fitness metrics, blood pressure and lipid goals, he is off Eliquis secondary to bright red blood per rectum and GI bleeding.  He has not seen GI, no prior work-up for this.  He denies any new cardiac symptoms at this time. No new symptoms today    EKG is abnormal with left intrafascicular block sinus rhythm nonspecific ST-T wave changes  Get 2D echo for correlation     Review of systems negative x14 point review of systems except as mentioned above     Historical data copied forward from previous encounters in EMR is unchanged        Assessment plan per my encounter  1.  DVT  Off Eliquis secondary to GI bleeding  Refer to GI for endoscopy and work-up  Previously had provoked event after knee surgery  Recommended work-up with possible hematology consult if recurrent     Blood pressure at home controlled on  "present therapy with history of essential hypertension  Continue ARB and calcium channel blocker presently     Primary prevention goals for CAD with family history  Fast lipid panel on next visit for ASCVD risk calculation     No restrictions from CV standpoint at this time     No indications for ischemic evaluation with asymptomatic patient with normal EF     Back to clinic in 6 months, referral to GI, continue off anticoagulation with bright red blood per rectum recently, go to the hospital if this happens again  CV status is stable  Multiple cardiac comorbidities addressed individually as above  Stable pharmacotherapy     Nazario Tobin MD, PhD         Historical data copied forward from previous encounters in EMR including the history, exam, and assessment/plan has been reviewed and is unchanged unless noted otherwise.    Cardiac medicines reviewed with risk, benefits, and necessity of each discussed.    Risk and benefit of cardiac testing reviewed including death heart attack stroke pain bleeding infection need for vascular /cardiovascular surgery were discussed and the patient     Objective:         /86 (BP Location: Left arm, Patient Position: Sitting, Cuff Size: Large Adult)   Pulse 90   Ht 175.3 cm (69.02\")   Wt 108 kg (237 lb)   SpO2 99%   BMI 34.98 kg/m²     Physical Exam    Assessment:         Diagnoses and all orders for this visit:    1. Benign essential HTN (Primary)  -     ECG 12 Lead  -     Adult Transthoracic Echo Complete W/ Color, Spectral and Contrast if Necessary Per Protocol; Future    2. Bleeding  -     Ambulatory Referral to Gastroenterology           Plan:              The pleasure to be involved in this patient's cardiovascular care.  Please call with any questions or concerns  Nazario Tobin MD, PhD    Most recent EKG as reviewed and interpreted by me:    ECG 12 Lead    Date/Time: 8/22/2022 4:45 PM  Performed by: Nazario Tobin MD  Authorized by: Nazario Tobin " MD Christian   Rhythm: sinus rhythm  Rate: normal  Conduction: left anterior fascicular block  QRS axis: normal  Other findings: non-specific ST-T wave changes and poor R wave progression    Clinical impression: abnormal EKG             Most recent echo as reviewed and interpreted by me:  Results for orders placed during the hospital encounter of 10/14/19    Adult Transthoracic Echo Complete W/ Cont if Necessary Per Protocol    Interpretation Summary  · Estimated EF = 54%.      Most recent stress test as reviewed and interpreted by me:      Most recent cardiac catheterization as reviewed interpreted by me:  No results found for this or any previous visit.    The following portions of the patient's history were reviewed and updated as appropriate: allergies, current medications, past family history, past medical history, past social history, past surgical history and problem list.      ROS:  14 point review of systems negative except as mentioned above    Current Outpatient Medications:   •  5-Hydroxytryptophan (5-HTP PO), Daily., Disp: , Rfl:   •  allopurinol (ZYLOPRIM) 300 MG tablet, Take 300 mg by mouth Daily., Disp: , Rfl:   •  amLODIPine (NORVASC) 10 MG tablet, Take 10 mg by mouth Daily., Disp: , Rfl:   •  Cholecalciferol (Vitamin D-3) 125 MCG (5000 UT) tablet, 10,000 mcg Daily., Disp: , Rfl:   •  DHEA 25 MG capsule, 50 mg Daily., Disp: , Rfl:   •  fluticasone (FLONASE) 50 MCG/ACT nasal spray, 2 sprays by Each Nare route Daily., Disp: , Rfl:   •  montelukast (SINGULAIR) 10 MG tablet, Take 10 mg by mouth Daily., Disp: , Rfl:   •  olmesartan (BENICAR) 40 MG tablet, Take 40 mg by mouth Daily., Disp: , Rfl:     Problem List:  Patient Active Problem List   Diagnosis   • Benign essential HTN     Past Medical History:  Past Medical History:   Diagnosis Date   • Hypertension      Past Surgical History:  History reviewed. No pertinent surgical history.  Social History:  Social History     Socioeconomic History   • Marital  status:    Tobacco Use   • Smoking status: Never Smoker   • Smokeless tobacco: Never Used   Vaping Use   • Vaping Use: Never used   Substance and Sexual Activity   • Alcohol use: Yes     Comment: occasional    • Drug use: Never   • Sexual activity: Defer     Allergies:  No Known Allergies  Immunizations:    There is no immunization history on file for this patient.         In-Office Procedure(s):  ECG 12 Lead    (Results Pending)        ASCVD RIsk Score::  The ASCVD Risk score (Katiuska HOGUE Jr., et al., 2013) failed to calculate for the following reasons:    Cannot find a previous HDL lab    Cannot find a previous total cholesterol lab    Imaging:    Results for orders placed during the hospital encounter of 09/16/21    XR Chest 1 View    Narrative  ONE-VIEW PORTABLE CHEST    HISTORY: Chest pain.    FINDINGS:  The lungs are well-expanded and clear and the heart and hilar  structures are within normal limits. There is no acute disease.    This report was finalized on 9/16/2021 1:54 PM by Dr. Kristian Matthews M.D.       Results for orders placed during the hospital encounter of 12/16/20    CT Cardiac Calcium Score Without Dye    Narrative  CORONARY ARTERY CALCIUM SCORING    HISTORY:  45-year-old male. Coronary artery calcium screening.    TECHNIQUE: Radiation dose reduction techniques were utilized, including  automated exposure control and exposure modulation based on body size.  Regions of interest delineated all areas of coronary artery  calcification on an ECG-gated noncontrasted cardiac CT scan.    FINDINGS: These regions were quantitatively scored for calcium as  follows:    Score 1: LM -- Calcium Score: -, Volume(mm3): -    Score 2: RCA -- Calcium Score: 0, Volume(mm3): 0    Score 3: LAD -- Calcium Score: 0, Volume(mm3): 0    Score 4: CX -- Calcium Score: 0, Volume(mm3): 0    Score 5: PDA -- Calcium Score: -, Volume(mm3): -    Score 6: Diagonal -- Calcium Score: -, Volume(mm3): -    Total: Calcium Score: 0,  Volume(mm3): 0    Impression  Total coronary artery Agatston calcification score is 0.    These findings represent no identifiable coronary atherosclerotic  burden.    A score of < 10 represents only a minimal coronary atherosclerotic  burden with a negative predictive value of 90-95%.    A score of  represents a mild coronary atherosclerotic burden with  a relative risk of death of 1.6 as compared to individuals with a score  of < 10.  The risk is higher if this score is > 75% for this age group  or if there is > 1 calcified vessel.    A score of 101-400 represents a moderate coronary atherosclerotic burden  with a relative risk of death of 1.7 as compared to individuals with a  score of < 10.  The risk is higher if this score is > 75% for this age  group or if there is > 1 calcified vessel.    A score of 401-1000 represents a large coronary atherosclerotic burden  with a relative risk of death of 2.5 as compared to individuals with a  score of < 10.  The risk is higher if this score is > 75% for this age  group or if there is > 1 calcified vessel.    A score of > 1000 represents an extensive coronary atherosclerotic  burden with a relative risk of death of 4 as compared to individuals  with a score of < 10.  The risk is higher if this score is > 75% for  this age group or if there is > 1 calcified vessel.    There are no pulmonary airspace opacities, effusions, or lymphadenopathy  within the visualized chest.    Dr. Jemma Crespo M.D reviewed the images and diagnostic data and  performed the interpretation.    This report was finalized on 12/16/2020 4:21 PM by Dr. Jemma Crespo M.D.      Results for orders placed during the hospital encounter of 12/16/20    CT Cardiac Calcium Score Without Dye    Narrative  CORONARY ARTERY CALCIUM SCORING    HISTORY:  45-year-old male. Coronary artery calcium screening.    TECHNIQUE: Radiation dose reduction techniques were utilized, including  automated exposure control and  exposure modulation based on body size.  Regions of interest delineated all areas of coronary artery  calcification on an ECG-gated noncontrasted cardiac CT scan.    FINDINGS: These regions were quantitatively scored for calcium as  follows:    Score 1: LM -- Calcium Score: -, Volume(mm3): -    Score 2: RCA -- Calcium Score: 0, Volume(mm3): 0    Score 3: LAD -- Calcium Score: 0, Volume(mm3): 0    Score 4: CX -- Calcium Score: 0, Volume(mm3): 0    Score 5: PDA -- Calcium Score: -, Volume(mm3): -    Score 6: Diagonal -- Calcium Score: -, Volume(mm3): -    Total: Calcium Score: 0, Volume(mm3): 0    Impression  Total coronary artery Agatston calcification score is 0.    These findings represent no identifiable coronary atherosclerotic  burden.    A score of < 10 represents only a minimal coronary atherosclerotic  burden with a negative predictive value of 90-95%.    A score of  represents a mild coronary atherosclerotic burden with  a relative risk of death of 1.6 as compared to individuals with a score  of < 10.  The risk is higher if this score is > 75% for this age group  or if there is > 1 calcified vessel.    A score of 101-400 represents a moderate coronary atherosclerotic burden  with a relative risk of death of 1.7 as compared to individuals with a  score of < 10.  The risk is higher if this score is > 75% for this age  group or if there is > 1 calcified vessel.    A score of 401-1000 represents a large coronary atherosclerotic burden  with a relative risk of death of 2.5 as compared to individuals with a  score of < 10.  The risk is higher if this score is > 75% for this age  group or if there is > 1 calcified vessel.    A score of > 1000 represents an extensive coronary atherosclerotic  burden with a relative risk of death of 4 as compared to individuals  with a score of < 10.  The risk is higher if this score is > 75% for  this age group or if there is > 1 calcified vessel.    There are no pulmonary  airspace opacities, effusions, or lymphadenopathy  within the visualized chest.    Dr. Jemma Crespo M.D reviewed the images and diagnostic data and  performed the interpretation.    This report was finalized on 12/16/2020 4:21 PM by Dr. Jemma Crespo M.D.      Lab Review:   No visits with results within 6 Month(s) from this visit.   Latest known visit with results is:   Admission on 09/16/2021, Discharged on 09/16/2021   Component Date Value   • QT Interval 09/16/2021 376    • Glucose 09/16/2021 118 (A)   • BUN 09/16/2021 13    • Creatinine 09/16/2021 0.90    • Sodium 09/16/2021 139    • Potassium 09/16/2021 4.2    • Chloride 09/16/2021 103    • CO2 09/16/2021 26.4    • Calcium 09/16/2021 9.3    • Total Protein 09/16/2021 6.7    • Albumin 09/16/2021 4.20    • ALT (SGPT) 09/16/2021 20    • AST (SGOT) 09/16/2021 13    • Alkaline Phosphatase 09/16/2021 73    • Total Bilirubin 09/16/2021 0.6    • eGFR Non African Amer 09/16/2021 90    • Globulin 09/16/2021 2.5    • A/G Ratio 09/16/2021 1.7    • BUN/Creatinine Ratio 09/16/2021 14.4    • Anion Gap 09/16/2021 9.6    • Troponin T 09/16/2021 <0.010    • Troponin T 09/16/2021 <0.010    • proBNP 09/16/2021 35.1    • D-Dimer, Quantitative 09/16/2021 <0.27    • WBC 09/16/2021 5.88    • RBC 09/16/2021 5.65    • Hemoglobin 09/16/2021 15.6    • Hematocrit 09/16/2021 47.3    • MCV 09/16/2021 83.7    • MCH 09/16/2021 27.6    • MCHC 09/16/2021 33.0    • RDW 09/16/2021 14.2    • RDW-SD 09/16/2021 43.3    • MPV 09/16/2021 10.2    • Platelets 09/16/2021 240    • Neutrophil % 09/16/2021 68.9    • Lymphocyte % 09/16/2021 20.9    • Monocyte % 09/16/2021 8.8    • Eosinophil % 09/16/2021 0.7    • Basophil % 09/16/2021 0.5    • Immature Grans % 09/16/2021 0.2    • Neutrophils, Absolute 09/16/2021 4.05    • Lymphocytes, Absolute 09/16/2021 1.23    • Monocytes, Absolute 09/16/2021 0.52    • Eosinophils, Absolute 09/16/2021 0.04    • Basophils, Absolute 09/16/2021 0.03    • Immature Grans, Absolute  09/16/2021 0.01    • nRBC 09/16/2021 0.0      Recent labs reviewed and interpreted for clinical significance and application            Level of Care:           Nazario Tobin MD  08/22/22  .

## 2022-10-05 ENCOUNTER — APPOINTMENT (OUTPATIENT)
Dept: CARDIOLOGY | Facility: HOSPITAL | Age: 47
End: 2022-10-05

## 2023-02-28 ENCOUNTER — HOSPITAL ENCOUNTER (OUTPATIENT)
Dept: CARDIOLOGY | Facility: HOSPITAL | Age: 48
Discharge: HOME | End: 2023-02-28
Admitting: INTERNAL MEDICINE
Payer: COMMERCIAL

## 2023-02-28 VITALS
WEIGHT: 230 LBS | SYSTOLIC BLOOD PRESSURE: 138 MMHG | DIASTOLIC BLOOD PRESSURE: 86 MMHG | BODY MASS INDEX: 34.07 KG/M2 | HEIGHT: 69 IN

## 2023-02-28 DIAGNOSIS — I10 BENIGN ESSENTIAL HTN: ICD-10-CM

## 2023-02-28 LAB
AORTIC DIMENSIONLESS INDEX: 0.8 (DI)
BH CV ECHO MEAS - AO MAX PG: 5.2 MMHG
BH CV ECHO MEAS - AO MEAN PG: 3 MMHG
BH CV ECHO MEAS - AO ROOT DIAM: 3.3 CM
BH CV ECHO MEAS - AO V2 MAX: 114 CM/SEC
BH CV ECHO MEAS - AO V2 VTI: 24.1 CM
BH CV ECHO MEAS - AVA(I,D): 3.3 CM2
BH CV ECHO MEAS - EDV(CUBED): 97.3 ML
BH CV ECHO MEAS - EDV(MOD-SP2): 154 ML
BH CV ECHO MEAS - EDV(MOD-SP4): 161 ML
BH CV ECHO MEAS - EF(MOD-BP): 57.2 %
BH CV ECHO MEAS - EF(MOD-SP2): 57.8 %
BH CV ECHO MEAS - EF(MOD-SP4): 55.9 %
BH CV ECHO MEAS - ESV(CUBED): 32.8 ML
BH CV ECHO MEAS - ESV(MOD-SP2): 65 ML
BH CV ECHO MEAS - ESV(MOD-SP4): 71 ML
BH CV ECHO MEAS - FS: 30.4 %
BH CV ECHO MEAS - IVS/LVPW: 1 CM
BH CV ECHO MEAS - IVSD: 1 CM
BH CV ECHO MEAS - LAT PEAK E' VEL: 11.9 CM/SEC
BH CV ECHO MEAS - LV DIASTOLIC VOL/BSA (35-75): 73.4 CM2
BH CV ECHO MEAS - LV MASS(C)D: 158.8 GRAMS
BH CV ECHO MEAS - LV MAX PG: 4 MMHG
BH CV ECHO MEAS - LV MEAN PG: 2 MMHG
BH CV ECHO MEAS - LV SYSTOLIC VOL/BSA (12-30): 32.4 CM2
BH CV ECHO MEAS - LV V1 MAX: 100.1 CM/SEC
BH CV ECHO MEAS - LV V1 VTI: 18.5 CM
BH CV ECHO MEAS - LVIDD: 4.6 CM
BH CV ECHO MEAS - LVIDS: 3.2 CM
BH CV ECHO MEAS - LVOT AREA: 4.3 CM2
BH CV ECHO MEAS - LVOT DIAM: 2.33 CM
BH CV ECHO MEAS - LVPWD: 1 CM
BH CV ECHO MEAS - MED PEAK E' VEL: 9 CM/SEC
BH CV ECHO MEAS - MV A DUR: 0.12 SEC
BH CV ECHO MEAS - MV A MAX VEL: 69 CM/SEC
BH CV ECHO MEAS - MV DEC SLOPE: 342.7 CM/SEC2
BH CV ECHO MEAS - MV DEC TIME: 0.17 MSEC
BH CV ECHO MEAS - MV E MAX VEL: 67.3 CM/SEC
BH CV ECHO MEAS - MV E/A: 0.98
BH CV ECHO MEAS - MV MAX PG: 2.23 MMHG
BH CV ECHO MEAS - MV MEAN PG: 1.16 MMHG
BH CV ECHO MEAS - MV P1/2T: 49.9 MSEC
BH CV ECHO MEAS - MV V2 VTI: 16.9 CM
BH CV ECHO MEAS - MVA(P1/2T): 4.4 CM2
BH CV ECHO MEAS - MVA(VTI): 4.7 CM2
BH CV ECHO MEAS - PA ACC TIME: 0.12 SEC
BH CV ECHO MEAS - PA PR(ACCEL): 26.7 MMHG
BH CV ECHO MEAS - PA V2 MAX: 123.5 CM/SEC
BH CV ECHO MEAS - RAP SYSTOLE: 8 MMHG
BH CV ECHO MEAS - RV MAX PG: 2.33 MMHG
BH CV ECHO MEAS - RV V1 MAX: 76.3 CM/SEC
BH CV ECHO MEAS - RV V1 VTI: 15.9 CM
BH CV ECHO MEAS - RVSP: 16.4 MMHG
BH CV ECHO MEAS - SI(MOD-SP2): 40.6 ML/M2
BH CV ECHO MEAS - SI(MOD-SP4): 41.1 ML/M2
BH CV ECHO MEAS - SV(LVOT): 78.9 ML
BH CV ECHO MEAS - SV(MOD-SP2): 89 ML
BH CV ECHO MEAS - SV(MOD-SP4): 90 ML
BH CV ECHO MEAS - TAPSE (>1.6): 2.8 CM
BH CV ECHO MEAS - TR MAX PG: 8.4 MMHG
BH CV ECHO MEAS - TR MAX VEL: 145.2 CM/SEC
BH CV ECHO MEASUREMENTS AVERAGE E/E' RATIO: 6.44
BH CV XLRA - RV BASE: 3.6 CM
BH CV XLRA - TDI S': 14.8 CM/SEC
LEFT ATRIUM VOLUME INDEX: 23.4 ML/M2
MAXIMAL PREDICTED HEART RATE: 171 BPM
STRESS TARGET HR: 145 BPM

## 2023-02-28 PROCEDURE — 93306 TTE W/DOPPLER COMPLETE: CPT | Performed by: INTERNAL MEDICINE

## 2023-02-28 PROCEDURE — 93306 TTE W/DOPPLER COMPLETE: CPT

## 2023-08-22 ENCOUNTER — OFFICE VISIT (OUTPATIENT)
Dept: CARDIOLOGY | Facility: CLINIC | Age: 48
End: 2023-08-22
Payer: COMMERCIAL

## 2023-08-22 VITALS
BODY MASS INDEX: 34.96 KG/M2 | HEIGHT: 69 IN | DIASTOLIC BLOOD PRESSURE: 106 MMHG | WEIGHT: 236 LBS | SYSTOLIC BLOOD PRESSURE: 158 MMHG | HEART RATE: 89 BPM | RESPIRATION RATE: 18 BRPM

## 2023-08-22 DIAGNOSIS — I10 ESSENTIAL HYPERTENSION: Primary | ICD-10-CM

## 2023-08-22 DIAGNOSIS — G62.9 NEUROPATHY: ICD-10-CM

## 2023-08-22 PROCEDURE — 93000 ELECTROCARDIOGRAM COMPLETE: CPT | Performed by: INTERNAL MEDICINE

## 2023-08-22 PROCEDURE — 99214 OFFICE O/P EST MOD 30 MIN: CPT | Performed by: INTERNAL MEDICINE

## 2023-08-22 RX ORDER — LOSARTAN POTASSIUM 25 MG/1
25 TABLET ORAL DAILY
Qty: 90 TABLET | Refills: 3 | Status: SHIPPED | OUTPATIENT
Start: 2023-08-22

## 2023-08-22 RX ORDER — APIXABAN 5 MG/1
5 TABLET, FILM COATED ORAL EVERY 12 HOURS SCHEDULED
COMMUNITY
Start: 2023-08-11 | End: 2023-08-22 | Stop reason: SDUPTHER

## 2023-08-22 RX ORDER — MULTIPLE VITAMINS W/ MINERALS TAB 9MG-400MCG
1 TAB ORAL DAILY
COMMUNITY

## 2023-08-22 RX ORDER — MAGNESIUM OXIDE 400 MG/1
400 TABLET ORAL DAILY
COMMUNITY

## 2023-08-22 RX ORDER — APIXABAN 5 MG/1
5 TABLET, FILM COATED ORAL EVERY 12 HOURS SCHEDULED
Qty: 60 TABLET | Refills: 11 | Status: SHIPPED | OUTPATIENT
Start: 2023-08-22

## 2023-08-22 NOTE — PROGRESS NOTES
Cardiology Clinic Note  Nazario Tobin MD, PhD    Subjective:     Encounter Date:08/22/2023      Patient ID: Elijah Mo is a 48 y.o. male.    Chief Complaint:  Chief Complaint   Patient presents with    Follow-up       HPI:    Previously  I the pleasure to see this 48-year-old gentleman today for history of DVT, he also has essential hypertension, last echo was in October 2019 and stress test some 10 years ago was unremarkable.  Family history of CAD with father passed away at age 52 of MI, mother with hypertension.  Medicines consist of Eliquis for DVT, on losartan and amlodipine for afterload reduction with essential hypertension.  He previously presented to the ER with right leg pain found to have DVT which appeared to be unprovoked.  2D echo revealed EF of 54 to 55% with normal global and regional function, no significant valvular abnormality other than trace to mild regurgitation as described, no valvular stenosis, normal myocardial thickness.  No history of any arrhythmia he is otherwise asymptomatic today.  We did discuss diet and exercise per AHA guidelines, cardiovascular fitness metrics, blood pressure and lipid goals, he is off Eliquis secondary to bright red blood per rectum and GI bleeding.  He has not seen GI, no prior work-up for this.  He denies any new cardiac symptoms at this time. No new symptoms today    On repeat encounter today above information was verified, has had a further repeat DVT PE originating left lower extremity he is back on Eliquis.  Heart function was otherwise normal with no clinical heart failure.  He has significant varicosities peripherally now with bilateral lower extremity history of DVT although a separate time points.  He has a hematology referral pending.  He has not had any further GI bleeding and we did discuss if he cannot tolerate anticoagulation with bilateral PEs now in an unprovoked event with needed hypercoagulable work-up then IVC filter may be  indicated at the discretion of hematology risk stratification.  He is off his losartan today and is hypertensive and we will restart his ARB therapy in combination with amlodipine for goal blood pressure less than 135 systolic     EKG is abnormal with left intrafascicular block sinus rhythm nonspecific ST-T wave changes  Get 2D echo for correlation     Review of systems negative x14 point review of systems except as mentioned above     Historical data copied forward from previous encounters in EMR is unchanged        Assessment plan per my encounter  1.  DVT now recurrent in the left leg  Anticoagulation for life recommended  Referral to hematology  If unable to tolerate anticoagulation would recommend IVC filter  Hypercoagulable work-up underway      Blood pressure at home uncontrolled on present therapy with history of essential hypertension  Continue calcium channel blocker refilled, restart ARB with losartan 25 daily, goal less than 135 systolic     Primary prevention goals for CAD with family history  Fast lipid panel on next visit for ASCVD risk calculation    Patient requesting neurology referral for neuropathy left lower extremity possible nerve conduction studies     No restrictions from CV standpoint at this time     No indications for ischemic evaluation with asymptomatic patient with normal EF     Back to clinic in 6 months, referral to GI, continue off anticoagulation with bright red blood per rectum recently, go to the hospital if this happens again  CV status is stable  Multiple cardiac comorbidities addressed individually as above  Stable pharmacotherapy     Nazario Tobin MD, PhD          Historical data copied forward from previous encounters in EMR including the history, exam, and assessment/plan has been reviewed and is unchanged unless noted otherwise.    Cardiac medicines reviewed with risk, benefits, and necessity of each discussed.    Risk and benefit of cardiac testing reviewed including  "death heart attack stroke pain bleeding infection need for vascular /cardiovascular surgery were discussed and the patient     Objective:         BP (!) 158/106 (BP Location: Left arm, Patient Position: Sitting)   Pulse 89   Resp 18   Ht 175.3 cm (69\")   Wt 107 kg (236 lb)   BMI 34.85 kg/mý           Diagnoses and all orders for this visit:    1. Neuropathy (Primary)  -     Ambulatory Referral to Neurology    Other orders  -     Eliquis 5 MG tablet tablet; Take 1 tablet by mouth Every 12 (Twelve) Hours.  Dispense: 60 tablet; Refill: 11  -     losartan (Cozaar) 25 MG tablet; Take 1 tablet by mouth Daily.  Dispense: 90 tablet; Refill: 3          The pleasure to be involved in this patient's cardiovascular care.  Please call with any questions or concerns  Nazario Tobin MD, PhD    Most recent EKG as reviewed and interpreted by me:    ECG 12 Lead    Date/Time: 8/22/2023 4:58 PM  Performed by: Nazario Tobin MD  Authorized by: Nazario Tobin MD   Comparison: not compared with previous ECG   Previous ECG: no previous ECG available  Rhythm: sinus rhythm  Rate: normal  Conduction: left anterior fascicular block  Other findings: poor R wave progression    Clinical impression: abnormal EKG         Most recent echo as reviewed and interpreted by me:  Results for orders placed during the hospital encounter of 02/28/23    Adult Transthoracic Echo Complete W/ Color, Spectral and Contrast if Necessary Per Protocol    Interpretation Summary    Left ventricular systolic function is normal. Calculated left ventricular EF = 57.2%    Left ventricular diastolic function was normal.    Estimated right ventricular systolic pressure from tricuspid regurgitation is normal (<35 mmHg). Calculated right ventricular systolic pressure from tricuspid regurgitation is 16 mmHg.      Most recent stress test as reviewed and interpreted by me:      Most recent cardiac catheterization as reviewed interpreted by me:  No results " found for this or any previous visit.    The following portions of the patient's history were reviewed and updated as appropriate: allergies, current medications, past family history, past medical history, past social history, past surgical history, and problem list.      ROS:  14 point review of systems negative except as mentioned above    Current Outpatient Medications:     allopurinol (ZYLOPRIM) 300 MG tablet, Take 1 tablet by mouth Daily., Disp: , Rfl:     amLODIPine (NORVASC) 10 MG tablet, Take 1 tablet by mouth Daily., Disp: , Rfl:     Cholecalciferol (Vitamin D-3) 125 MCG (5000 UT) tablet, 80 tablets Daily., Disp: , Rfl:     Eliquis 5 MG tablet tablet, Take 1 tablet by mouth Every 12 (Twelve) Hours., Disp: 60 tablet, Rfl: 11    fluticasone (FLONASE) 50 MCG/ACT nasal spray, 2 sprays by Each Nare route Daily., Disp: , Rfl:     magnesium oxide (MAG-OX) 400 MG tablet, Take 1 tablet by mouth Daily., Disp: , Rfl:     montelukast (SINGULAIR) 10 MG tablet, Take 1 tablet by mouth Daily., Disp: , Rfl:     multivitamin with minerals (CENTRUM SILVER PO), Take 1 tablet by mouth Daily., Disp: , Rfl:     losartan (Cozaar) 25 MG tablet, Take 1 tablet by mouth Daily., Disp: 90 tablet, Rfl: 3    Problem List:  Patient Active Problem List   Diagnosis    Benign essential HTN     Past Medical History:  Past Medical History:   Diagnosis Date    Hypertension      Past Surgical History:  No past surgical history on file.  Social History:  Social History     Socioeconomic History    Marital status:    Tobacco Use    Smoking status: Never    Smokeless tobacco: Never   Vaping Use    Vaping Use: Never used   Substance and Sexual Activity    Alcohol use: Yes     Comment: occasional     Drug use: Never    Sexual activity: Defer     Allergies:  No Known Allergies  Immunizations:  Immunization History   Administered Date(s) Administered    COVID-19 (MODERNA) 1st,2nd,3rd Dose Monovalent 03/16/2021, 04/13/2021    COVID-19 (MODERNA)  Monovalent Original Booster 12/30/2021            In-Office Procedure(s):  No orders to display        ASCVD RIsk Score::  The ASCVD Risk score (Elise RUBIN, et al., 2019) failed to calculate for the following reasons:    Cannot find a previous HDL lab    Cannot find a previous total cholesterol lab    Imaging:    Results for orders placed during the hospital encounter of 09/16/21    XR Chest 1 View    Narrative  ONE-VIEW PORTABLE CHEST    HISTORY: Chest pain.    FINDINGS:  The lungs are well-expanded and clear and the heart and hilar  structures are within normal limits. There is no acute disease.    This report was finalized on 9/16/2021 1:54 PM by Dr. Kristian Matthews M.D.       Results for orders placed during the hospital encounter of 12/16/20    CT Cardiac Calcium Score Without Dye    Narrative  CORONARY ARTERY CALCIUM SCORING    HISTORY:  45-year-old male. Coronary artery calcium screening.    TECHNIQUE: Radiation dose reduction techniques were utilized, including  automated exposure control and exposure modulation based on body size.  Regions of interest delineated all areas of coronary artery  calcification on an ECG-gated noncontrasted cardiac CT scan.    FINDINGS: These regions were quantitatively scored for calcium as  follows:    Score 1: LM -- Calcium Score: -, Volume(mm3): -    Score 2: RCA -- Calcium Score: 0, Volume(mm3): 0    Score 3: LAD -- Calcium Score: 0, Volume(mm3): 0    Score 4: CX -- Calcium Score: 0, Volume(mm3): 0    Score 5: PDA -- Calcium Score: -, Volume(mm3): -    Score 6: Diagonal -- Calcium Score: -, Volume(mm3): -    Total: Calcium Score: 0, Volume(mm3): 0    Impression  Total coronary artery Agatston calcification score is 0.    These findings represent no identifiable coronary atherosclerotic  burden.    A score of < 10 represents only a minimal coronary atherosclerotic  burden with a negative predictive value of 90-95%.    A score of  represents a mild coronary  atherosclerotic burden with  a relative risk of death of 1.6 as compared to individuals with a score  of < 10.  The risk is higher if this score is > 75% for this age group  or if there is > 1 calcified vessel.    A score of 101-400 represents a moderate coronary atherosclerotic burden  with a relative risk of death of 1.7 as compared to individuals with a  score of < 10.  The risk is higher if this score is > 75% for this age  group or if there is > 1 calcified vessel.    A score of 401-1000 represents a large coronary atherosclerotic burden  with a relative risk of death of 2.5 as compared to individuals with a  score of < 10.  The risk is higher if this score is > 75% for this age  group or if there is > 1 calcified vessel.    A score of > 1000 represents an extensive coronary atherosclerotic  burden with a relative risk of death of 4 as compared to individuals  with a score of < 10.  The risk is higher if this score is > 75% for  this age group or if there is > 1 calcified vessel.    There are no pulmonary airspace opacities, effusions, or lymphadenopathy  within the visualized chest.    Dr. Jemma Crespo M.D reviewed the images and diagnostic data and  performed the interpretation.    This report was finalized on 12/16/2020 4:21 PM by Dr. Jemma Crespo M.D.      Results for orders placed during the hospital encounter of 12/16/20    CT Cardiac Calcium Score Without Dye    Narrative  CORONARY ARTERY CALCIUM SCORING    HISTORY:  45-year-old male. Coronary artery calcium screening.    TECHNIQUE: Radiation dose reduction techniques were utilized, including  automated exposure control and exposure modulation based on body size.  Regions of interest delineated all areas of coronary artery  calcification on an ECG-gated noncontrasted cardiac CT scan.    FINDINGS: These regions were quantitatively scored for calcium as  follows:    Score 1: LM -- Calcium Score: -, Volume(mm3): -    Score 2: RCA -- Calcium Score: 0,  Volume(mm3): 0    Score 3: LAD -- Calcium Score: 0, Volume(mm3): 0    Score 4: CX -- Calcium Score: 0, Volume(mm3): 0    Score 5: PDA -- Calcium Score: -, Volume(mm3): -    Score 6: Diagonal -- Calcium Score: -, Volume(mm3): -    Total: Calcium Score: 0, Volume(mm3): 0    Impression  Total coronary artery Agatston calcification score is 0.    These findings represent no identifiable coronary atherosclerotic  burden.    A score of < 10 represents only a minimal coronary atherosclerotic  burden with a negative predictive value of 90-95%.    A score of  represents a mild coronary atherosclerotic burden with  a relative risk of death of 1.6 as compared to individuals with a score  of < 10.  The risk is higher if this score is > 75% for this age group  or if there is > 1 calcified vessel.    A score of 101-400 represents a moderate coronary atherosclerotic burden  with a relative risk of death of 1.7 as compared to individuals with a  score of < 10.  The risk is higher if this score is > 75% for this age  group or if there is > 1 calcified vessel.    A score of 401-1000 represents a large coronary atherosclerotic burden  with a relative risk of death of 2.5 as compared to individuals with a  score of < 10.  The risk is higher if this score is > 75% for this age  group or if there is > 1 calcified vessel.    A score of > 1000 represents an extensive coronary atherosclerotic  burden with a relative risk of death of 4 as compared to individuals  with a score of < 10.  The risk is higher if this score is > 75% for  this age group or if there is > 1 calcified vessel.    There are no pulmonary airspace opacities, effusions, or lymphadenopathy  within the visualized chest.    Dr. Jemma Crespo M.D reviewed the images and diagnostic data and  performed the interpretation.    This report was finalized on 12/16/2020 4:21 PM by Dr. Jemma Crespo M.D.      Lab Review:   Hospital Outpatient Visit on 02/28/2023   Component Date  Value    Target HR (85%) 02/28/2023 145     Max. Pred. HR (100%) 02/28/2023 171     EF(MOD-bp) 02/28/2023 57.2     LVIDd 02/28/2023 4.6     LVIDs 02/28/2023 3.2     IVSd 02/28/2023 1.00     LVPWd 02/28/2023 1.00     FS 02/28/2023 30.4     IVS/LVPW 02/28/2023 1.00     ESV(cubed) 02/28/2023 32.8     LV Sys Vol (BSA correcte* 02/28/2023 32.4     EDV(cubed) 02/28/2023 97.3     LV Ortiz Vol (BSA correct* 02/28/2023 73.4     LVOT area 02/28/2023 4.3     LV mass(C)d 02/28/2023 158.8     LVOT diam 02/28/2023 2.33     EDV(MOD-sp2) 02/28/2023 154.0     EDV(MOD-sp4) 02/28/2023 161.0     ESV(MOD-sp2) 02/28/2023 65.0     ESV(MOD-sp4) 02/28/2023 71.0     SV(MOD-sp2) 02/28/2023 89.0     SV(MOD-sp4) 02/28/2023 90.0     SI(MOD-sp2) 02/28/2023 40.6     SI(MOD-sp4) 02/28/2023 41.1     EF(MOD-sp2) 02/28/2023 57.8     EF(MOD-sp4) 02/28/2023 55.9     MV E max nasir 02/28/2023 67.3     MV A max nasir 02/28/2023 69.0     MV dec time 02/28/2023 0.17     MV E/A 02/28/2023 0.98     MV A dur 02/28/2023 0.12     LA ESV Index (BP) 02/28/2023 23.4     Med Peak E' Nasir 02/28/2023 9.0     Lat Peak E' Nasir 02/28/2023 11.9     Avg E/e' ratio 02/28/2023 6.44     SV(LVOT) 02/28/2023 78.9     RV Base 02/28/2023 3.6     RV S' 02/28/2023 14.8     LV V1 max 02/28/2023 100.1     LV V1 max PG 02/28/2023 4.0     LV V1 mean PG 02/28/2023 2.00     LV V1 VTI 02/28/2023 18.5     Ao pk nasir 02/28/2023 114.0     Ao max PG 02/28/2023 5.2     Ao mean PG 02/28/2023 3.0     Ao V2 VTI 02/28/2023 24.1     ABISAI(I,D) 02/28/2023 3.3     MV max PG 02/28/2023 2.23     MV mean PG 02/28/2023 1.16     MV V2 VTI 02/28/2023 16.9     MV P1/2t 02/28/2023 49.9     MVA(P1/2t) 02/28/2023 4.4     MVA(VTI) 02/28/2023 4.7     MV dec slope 02/28/2023 342.7     TR max nasir 02/28/2023 145.2     TR max PG 02/28/2023 8.4     RVSP(TR) 02/28/2023 16.4     RAP systole 02/28/2023 8.0     RV V1 max PG 02/28/2023 2.33     RV V1 max 02/28/2023 76.3     RV V1 VTI 02/28/2023 15.9     PA V2 max 02/28/2023 123.5      PA acc time 02/28/2023 0.12     PA pr(Accel) 02/28/2023 26.7     Ao root diam 02/28/2023 3.3     TAPSE (>1.6) 02/28/2023 2.80     Dimensionless Index 02/28/2023 0.80      Recent labs reviewed and interpreted for clinical significance and application            Level of Care:           Nazario Tobin MD  08/22/23  .

## 2023-12-04 ENCOUNTER — OFFICE VISIT (OUTPATIENT)
Dept: NEUROLOGY | Facility: CLINIC | Age: 48
End: 2023-12-04
Payer: COMMERCIAL

## 2023-12-04 VITALS
OXYGEN SATURATION: 96 % | BODY MASS INDEX: 34.8 KG/M2 | HEART RATE: 87 BPM | SYSTOLIC BLOOD PRESSURE: 146 MMHG | HEIGHT: 69 IN | DIASTOLIC BLOOD PRESSURE: 72 MMHG | WEIGHT: 235 LBS

## 2023-12-04 DIAGNOSIS — R20.0 NUMBNESS AND TINGLING IN BOTH HANDS: Primary | ICD-10-CM

## 2023-12-04 DIAGNOSIS — R20.2 NUMBNESS AND TINGLING IN BOTH HANDS: Primary | ICD-10-CM

## 2023-12-04 PROCEDURE — 99203 OFFICE O/P NEW LOW 30 MIN: CPT | Performed by: PSYCHIATRY & NEUROLOGY

## 2023-12-04 RX ORDER — OLMESARTAN MEDOXOMIL 40 MG/1
40 TABLET ORAL DAILY
COMMUNITY

## 2023-12-04 NOTE — PROGRESS NOTES
Chief complaint: Paresthesias of the hands    Patient ID: Elijah Mo is a 48 y.o. male.    HPI: I had the pleasure of seeing your patient today.  As you may know he is a 48-year-old gentleman being seen at the request of and referred to us by Dr. Tobin for history of paresthesias of the hands.  Patient states that he has a history of numbness and tingling of both hands.  He says that it tends to come and go.  He typically experiences it using his hands.  He has also noticed that it bothers him at night or in the morning when he wakes.  Typically he is able to get relief if he shakes his hands or moves around.  He does have a history of some neck pain however it peers to be more thoracic located.  He denies any bowel or bladder changes.  No balance issues.  No focal weakness or numbness of his arms or legs.  No severe head injuries.  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  He did have a nerve conduction study however it was of his left lower extremity which did confirm damage to left medial and lateral plantar nerves.  He does have a history of tarsal tunnel syndrome on the left and has had surgical intervention for that previously.    The following portions of the patient's history were reviewed and updated as appropriate: allergies, current medications, past family history, past medical history, past social history, past surgical history and problem list.    Review of Systems   Musculoskeletal:  Positive for neck pain and neck stiffness. Negative for gait problem.   Neurological:  Positive for weakness and numbness (feet and hands). Negative for dizziness, tremors, seizures, syncope, facial asymmetry, speech difficulty, light-headedness and headaches.      I have reviewed the review of systems above performed by my medical assistant.      Vitals:    12/04/23 1006   BP: 146/72   Pulse: 87   SpO2: 96%       Neurologic Exam     Mental Status   Oriented to person,  place, and time.   Registration: recalls 3 of 3 objects. Follows 3 step commands.   Attention: normal. Concentration: normal.   Speech: speech is normal   Level of consciousness: alert  Knowledge: consistent with education (No deficits found.).   Normal comprehension.     Cranial Nerves     CN II   Visual fields full to confrontation.     CN III, IV, VI   Pupils are equal, round, and reactive to light.  Extraocular motions are normal.   CN III: no CN III palsy  CN VI: no CN VI palsy  Nystagmus: none   Diplopia: none    CN V   Facial sensation intact.     CN VII   Facial expression full, symmetric.     CN VIII   CN VIII normal.     CN IX, X   CN IX normal.   CN X normal.     CN XI   CN XI normal.     CN XII   CN XII normal.     Motor Exam   Muscle bulk: normal  Right arm tone: normal  Left arm tone: normal  Right leg tone: normal  Left leg tone: normal    Strength   Right neck flexion: 5/5  Left neck flexion: 5/5  Right neck extension: 5/5  Left neck extension: 5/5  Right deltoid: 5/5  Left deltoid: 5/5  Right biceps: 5/5  Left biceps: 5/5  Right triceps: 5/5  Left triceps: 5/5  Right wrist flexion: 5/5  Left wrist flexion: 5/5  Right wrist extension: 5/5  Left wrist extension: 5/5  Right interossei: 5/5  Left interossei: 5/5  Right abdominals: 5/5  Left abdominals: 5/5  Right iliopsoas: 5/5  Left iliopsoas: 5/5  Right quadriceps: 5/5  Left quadriceps: 5/5  Right hamstrin/5  Left hamstrin/5  Right glutei: 5/5  Left glutei: 5/5  Right anterior tibial: 5/5  Left anterior tibial: 5/5  Right posterior tibial: 5/5  Left posterior tibial: 5/5  Right peroneal: 5/5  Left peroneal: 5/5  Right gastroc: 5/5  Left gastroc: 5/5    Sensory Exam   Light touch normal.   Vibration normal.   Proprioception normal.   Pinprick normal.     Gait, Coordination, and Reflexes     Gait  Gait: normal    Coordination   Romberg: negative    Tremor   Resting tremor: absent  Intention tremor: absent    Reflexes   Right brachioradialis:  2+  Left brachioradialis: 2+  Right biceps: 2+  Left biceps: 2+  Right triceps: 2+  Left triceps: 2+  Right patellar: 2+  Left patellar: 2+  Right achilles: 2+  Left achilles: 2+  Right : 2+  Left : 2+Station is normal.       Physical Exam  Eyes:      Extraocular Movements: EOM normal.      Pupils: Pupils are equal, round, and reactive to light.   Neurological:      Mental Status: He is oriented to person, place, and time.      Coordination: Romberg Test normal.      Gait: Gait is intact.      Deep Tendon Reflexes:      Reflex Scores:       Tricep reflexes are 2+ on the right side and 2+ on the left side.       Bicep reflexes are 2+ on the right side and 2+ on the left side.       Brachioradialis reflexes are 2+ on the right side and 2+ on the left side.       Patellar reflexes are 2+ on the right side and 2+ on the left side.       Achilles reflexes are 2+ on the right side and 2+ on the left side.  Psychiatric:         Speech: Speech normal.         Procedures    Assessment/Plan: Complex to schedule him for an EMG/nerve conduction study of his hands.  He likely has carpal tunnel syndrome however there may be elements of ulnar neuropathies, likely on the left.  We will see him back in about 3 months for review study and direct further care at that time.       Diagnoses and all orders for this visit:    1. Numbness and tingling in both hands (Primary)  -     EMG & Nerve Conduction Test; Future           Chirag Eduardo II, MD

## 2023-12-12 ENCOUNTER — PATIENT ROUNDING (BHMG ONLY) (OUTPATIENT)
Dept: NEUROLOGY | Facility: CLINIC | Age: 48
End: 2023-12-12
Payer: COMMERCIAL

## 2024-03-11 ENCOUNTER — HOSPITAL ENCOUNTER (OUTPATIENT)
Dept: INFUSION THERAPY | Facility: HOSPITAL | Age: 49
Discharge: HOME | End: 2024-03-11
Admitting: PSYCHIATRY & NEUROLOGY
Payer: COMMERCIAL

## 2024-03-11 DIAGNOSIS — R20.0 NUMBNESS AND TINGLING IN BOTH HANDS: ICD-10-CM

## 2024-03-11 DIAGNOSIS — R20.2 NUMBNESS AND TINGLING IN BOTH HANDS: ICD-10-CM

## 2024-03-11 PROCEDURE — 95912 NRV CNDJ TEST 11-12 STUDIES: CPT

## 2024-03-11 PROCEDURE — 95886 MUSC TEST DONE W/N TEST COMP: CPT | Performed by: PSYCHIATRY & NEUROLOGY

## 2024-03-11 PROCEDURE — 95911 NRV CNDJ TEST 9-10 STUDIES: CPT | Performed by: PSYCHIATRY & NEUROLOGY

## 2024-03-11 PROCEDURE — 95886 MUSC TEST DONE W/N TEST COMP: CPT

## 2024-03-11 NOTE — PROCEDURES
"EMG and Nerve Conduction Studies    I.      Instrument used: Neuromax 1002  II.     Please see data sheets for tabular summary of NCS and details on methods, temperatures and lab standards.   III.    EMG muscles tested for upper extremity studies include the deltoid, biceps, triceps, pronator teres, extensor digitorum communis, first dorsal interosseous and abductor pollicis brevis.    IV.   EMG muscles tested for lower extremity studies include the vastus lateralis, tibialis anterior, peroneus longus, medial gastrocnemius and extensor digitorum brevis.    V.    Additional muscles tested as needed.  Paraspinal muscles tested as needed.   VI.   Please see data sheets for tabular summary of EMG findings.   VII. The complete report includes the data sheets.      Indication: Bilateral right greater than left hand pain  History: 48-year-old white male with bilateral hand pain right much worse than left.  It is in the wrist and in the fingers he says.  Some numbness is present.  He has history of left tarsal tunnel release with some improvement.  Denies diabetes or thyroid disease.  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      Ht: 175.3 cm  Wt: 107 kg  HbA1C: No results found for: \"HGBA1C\"  TSH: No results found for: \"TSH\"    Technical summary:  Nerve conduction studies were obtained in both arms.  The hands were rather cold and so they were warmed prior to study with temperatures at least 32 °C measured on the palms.  Needle examination was obtained on selected muscles in both arms.    Results:  1.  Normal median antidromic sensory distal latencies bilaterally.  2.  Normal median orthodromic palmar sensory distal latencies and amplitudes bilaterally.  (The palmar method was included since carpal tunnel syndrome was on the differential list and the antidromic latencies were normal)  3.  Normal ulnar antidromic sensory distal latencies and amplitudes bilaterally.  4.  Normal " right radial sensory distal latency and amplitude.  5.  Normal median motor conduction velocities with normal distal latencies and amplitudes bilaterally.  6.  Normal ulnar motor conduction velocities with normal distal latencies and amplitudes bilaterally.  7.  Needle examination of selected muscles in both arms showed normal insertional activities throughout.  There were normal motor units and recruitment patterns throughout.      Impression:  Normal study.  No evidence of a median or ulnar neuropathy or cervical radiculopathy on either side by this study.  Study results were discussed with the patient.    Bipin Regan M.D.              Dictated utilizing Dragon dictation.

## 2024-03-22 ENCOUNTER — LAB (OUTPATIENT)
Dept: LAB | Facility: HOSPITAL | Age: 49
End: 2024-03-22
Payer: COMMERCIAL

## 2024-03-22 ENCOUNTER — OFFICE VISIT (OUTPATIENT)
Dept: NEUROLOGY | Facility: CLINIC | Age: 49
End: 2024-03-22
Payer: COMMERCIAL

## 2024-03-22 VITALS
OXYGEN SATURATION: 96 % | SYSTOLIC BLOOD PRESSURE: 130 MMHG | DIASTOLIC BLOOD PRESSURE: 90 MMHG | BODY MASS INDEX: 35.84 KG/M2 | HEART RATE: 87 BPM | WEIGHT: 242 LBS | HEIGHT: 69 IN

## 2024-03-22 DIAGNOSIS — M54.2 CHRONIC NECK PAIN: ICD-10-CM

## 2024-03-22 DIAGNOSIS — R20.2 NUMBNESS AND TINGLING IN BOTH HANDS: ICD-10-CM

## 2024-03-22 DIAGNOSIS — R20.0 NUMBNESS AND TINGLING OF BOTH LEGS: ICD-10-CM

## 2024-03-22 DIAGNOSIS — R20.2 NUMBNESS AND TINGLING OF BOTH LEGS: ICD-10-CM

## 2024-03-22 DIAGNOSIS — R20.0 NUMBNESS AND TINGLING IN BOTH HANDS: Primary | ICD-10-CM

## 2024-03-22 DIAGNOSIS — G89.29 CHRONIC NECK PAIN: ICD-10-CM

## 2024-03-22 DIAGNOSIS — M54.50 CHRONIC LEFT-SIDED LOW BACK PAIN WITHOUT SCIATICA: ICD-10-CM

## 2024-03-22 DIAGNOSIS — R20.0 NUMBNESS AND TINGLING IN BOTH HANDS: ICD-10-CM

## 2024-03-22 DIAGNOSIS — G89.29 CHRONIC LEFT-SIDED LOW BACK PAIN WITHOUT SCIATICA: ICD-10-CM

## 2024-03-22 DIAGNOSIS — R20.2 NUMBNESS AND TINGLING IN BOTH HANDS: Primary | ICD-10-CM

## 2024-03-22 PROBLEM — I82.819 ACUTE SUPERFICIAL VENOUS THROMBOSIS OF LOWER EXTREMITY: Status: ACTIVE | Noted: 2019-01-10

## 2024-03-22 PROBLEM — M19.90 ARTHRITIS: Status: ACTIVE | Noted: 2017-09-05

## 2024-03-22 LAB
CRP SERPL-MCNC: 0.4 MG/DL (ref 0–0.5)
ERYTHROCYTE [SEDIMENTATION RATE] IN BLOOD: 13 MM/HR (ref 0–20)
HOLD SPECIMEN: NORMAL
RPR SER QL: NORMAL
TSH SERPL DL<=0.05 MIU/L-ACNC: 1.81 UIU/ML (ref 0.27–4.2)
VIT B12 BLD-MCNC: 751 PG/ML (ref 211–946)

## 2024-03-22 PROCEDURE — 86258 DGP ANTIBODY EACH IG CLASS: CPT

## 2024-03-22 PROCEDURE — 84155 ASSAY OF PROTEIN SERUM: CPT

## 2024-03-22 PROCEDURE — 85652 RBC SED RATE AUTOMATED: CPT

## 2024-03-22 PROCEDURE — 82175 ASSAY OF ARSENIC: CPT

## 2024-03-22 PROCEDURE — 82525 ASSAY OF COPPER: CPT

## 2024-03-22 PROCEDURE — 86334 IMMUNOFIX E-PHORESIS SERUM: CPT

## 2024-03-22 PROCEDURE — 84207 ASSAY OF VITAMIN B-6: CPT

## 2024-03-22 PROCEDURE — 83655 ASSAY OF LEAD: CPT

## 2024-03-22 PROCEDURE — 86140 C-REACTIVE PROTEIN: CPT

## 2024-03-22 PROCEDURE — 84425 ASSAY OF VITAMIN B-1: CPT

## 2024-03-22 PROCEDURE — 86592 SYPHILIS TEST NON-TREP QUAL: CPT

## 2024-03-22 PROCEDURE — 84443 ASSAY THYROID STIM HORMONE: CPT

## 2024-03-22 PROCEDURE — 36415 COLL VENOUS BLD VENIPUNCTURE: CPT

## 2024-03-22 PROCEDURE — 82784 ASSAY IGA/IGD/IGG/IGM EACH: CPT

## 2024-03-22 PROCEDURE — 84446 ASSAY OF VITAMIN E: CPT

## 2024-03-22 PROCEDURE — 83825 ASSAY OF MERCURY: CPT

## 2024-03-22 PROCEDURE — 82607 VITAMIN B-12: CPT

## 2024-03-22 PROCEDURE — 83521 IG LIGHT CHAINS FREE EACH: CPT

## 2024-03-22 PROCEDURE — 86364 TISS TRNSGLTMNASE EA IG CLAS: CPT

## 2024-03-22 PROCEDURE — 84165 PROTEIN E-PHORESIS SERUM: CPT

## 2024-03-22 NOTE — PROGRESS NOTES
DOS: 3/22/2024  NAME: Elijah Mo   : 1975  PCP: Provider, No Known    Chief Complaint   Patient presents with    Numbness and tingling in both hands      SUBJECTIVE  Neurological Problem:  49 y.o. RHW male with a past medical history of hypertension, history of cervical disc disorder, history of superficial DVT of lower extremity, arthritis, vitamin D deficiency. They are seen in follow up today for numbness and tingling of the hands, however the problem is new to the examiner. Patient last seen by Dr. Chirag Eduardo in 2023, with a summary of the history taken from the previous note with additions/modifications as indicated. He is unaccompanied.    Interval History:   Mr. Mo initially presented to our clinic in 2023, seen by Dr. Eduardo for paresthesias of the hands.  Per review of chart he reported a history of numbness and tingling of both hands attended to come and go, typically experienced while using his hands.  He also noted it bothered him at night or in the morning upon waking.  Typically he was able to get relief by shaking his hands or moving around.  He denies any history of head.,  Denies any focal weakness or numbness of his extremities, denies any bowel or bladder changes.  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  He did have a previous nerve conduction study of his left lower extremity which showed damage to the left medial and lateral plantar nerves.  He also has a history of tarsal tunnel syndrome on the left and had surgical intervention for that.  Dr. Eduardo ordered and EMG of his upper extremities.    The patient presents today in follow-up and reports that he is continuing to experience numbness and pain in his hands that generates from both wrists, described as a aching pain or stabbing at its worst. He says the hands have sensation changes intermittently throughout the day but are worse upon  "waking in the middle of the night.  He also reports that his feet feel numb and they are worse after standing on them.  He does feel like his extremities are weaker, occasionally drops things out of his hands without reason and says his  is not \"what it used to be\".  He feels that steps are challenging and he notices balance changes.  He does have a history of neck and lower back pain.  He says his back \"went out\" and he was unable to walk after sneezing and then loading the back of the pickup truck.  He went to the hospital and received \"pain shots\" and was fine after a few days.  He does follow with a chiropractor when he is in pain.  He states that he is no longer working in sheet-metal.  He denies any bowel or bladder changes.  No focal or unilateral weakness to 1 side of the body.  He does not have a history of chemotherapy treatment or history of diabetes.  He has not had recent imaging completed of his spine.  He says that he is scheduled for an EMG of his right lower extremity on Monday with an outside provider.  Review of recent labs show a B12 of 732, ferritin 79, CBC and CMP unremarkable.    Review of Systems   Neurological:  Positive for numbness. Negative for dizziness, tremors, seizures, syncope, facial asymmetry, speech difficulty, weakness, light-headedness and headaches.   Psychiatric/Behavioral:  Negative for agitation, behavioral problems, confusion, decreased concentration, dysphoric mood, hallucinations, self-injury, sleep disturbance and suicidal ideas. The patient is not nervous/anxious and is not hyperactive.         The following portions of the patient's history were reviewed and updated as appropriate: allergies, current medications, past family history, past medical history, past social history, past surgical history and problem list.    Current Medications:   Current Outpatient Medications:     allopurinol (ZYLOPRIM) 300 MG tablet, Take 1 tablet by mouth Daily., Disp: , Rfl:     " "amLODIPine (NORVASC) 10 MG tablet, Take 1 tablet by mouth Daily., Disp: , Rfl:     Cholecalciferol (Vitamin D-3) 125 MCG (5000 UT) tablet, 80 tablets Daily., Disp: , Rfl:     Eliquis 5 MG tablet tablet, Take 1 tablet by mouth Every 12 (Twelve) Hours., Disp: 60 tablet, Rfl: 11    Escitalopram Oxalate (LEXAPRO PO), Take  by mouth., Disp: , Rfl:     fluticasone (FLONASE) 50 MCG/ACT nasal spray, 2 sprays by Each Nare route Daily., Disp: , Rfl:     losartan (Cozaar) 25 MG tablet, Take 1 tablet by mouth Daily., Disp: 90 tablet, Rfl: 3    magnesium oxide (MAG-OX) 400 MG tablet, Take 1 tablet by mouth Daily., Disp: , Rfl:     montelukast (SINGULAIR) 10 MG tablet, Take 1 tablet by mouth Daily., Disp: , Rfl:     multivitamin with minerals (CENTRUM SILVER PO), Take 1 tablet by mouth Daily., Disp: , Rfl:     olmesartan (BENICAR) 40 MG tablet, Take 1 tablet by mouth Daily., Disp: , Rfl:   **I did not stop or change the above medications.  Patient's medication list was updated to reflect medications they have reported as currently taking, including medication changes made by other providers.    Objective   Vital Signs:  /90   Pulse 87   Ht 175.3 cm (69.02\")   Wt 110 kg (242 lb)   SpO2 96%   BMI 35.72 kg/m²   Body mass index is 35.72 kg/m².    Physical Exam   Physical Exam:  GENERAL: NAD, alert  HEENT: Normocephalic, atraumatic   Resp: Even and unlabored  Extremities: No edema, red-tinted lower extremities bilat, hair growth present on both legs and great toes  Psychiatric: Normal mood and affect    Neurological:   MS: AOx3, recent/remote memory intact, normal attention/concentration, language intact, no neglect.  CN: visual acuity grossly normal, PERRL, EOMI, no nystagmus, no facial droop, no dysarthria  Motor: Normal tone. No tremor or abnormal movements noted.   Shoulder abductors 5/5  Elbow flexors 5/5  Elbow extensors 5/5   Bilateral hand interossei 5/5  Left  2+  Right  2+  Hip flexors 5/5  Knee extensors " "5/5  Hamstring strength 5/5  Dorsiflexors 5/5  Plantar flexors 5/5  Sensory: Intact to light touch, cold temperature, vibration in all four ext. Pinprick sensation decreased in left foot.   Coordination: No dysmetria finger to nose   Rapid alternating movements: Normal finger to thumb tap  Gait and station: Normal gait and station    Reflexes   Right brachioradialis: 2+  Left brachioradialis: 2+  Right biceps: 2+  Left biceps: 2+  Right triceps: 2+  Left triceps: 2+  Right patellar: 2+  Left patellar: 2+  Right achilles: 2+  Left achilles: 2+     Result Review :  The following data was reviewed by: LUISITO Jovel on 03/22/2024:  Laboratory Results:         Lab Results   Component Value Date    WBC 5.88 09/16/2021    HGB 15.6 09/16/2021    HCT 47.3 09/16/2021    MCV 83.7 09/16/2021     09/16/2021     Lab Results   Component Value Date    GLUCOSE 118 (H) 09/16/2021    BUN 13 09/16/2021    CREATININE 0.90 09/16/2021    EGFRIFNONA 90 09/16/2021    BCR 14.4 09/16/2021    K 4.2 09/16/2021    CO2 26.4 09/16/2021    CALCIUM 9.3 09/16/2021    ALBUMIN 4.20 09/16/2021    AST 13 09/16/2021    ALT 20 09/16/2021     No results found for: \"HGBA1C\"  No results found for: \"CHOL\"  No results found for: \"HDL\"  No results found for: \"LDL\"  No results found for: \"TRIG\"  No results found for: \"RPR\"  No results found for: \"TSH\"  Lab Results   Component Value Date    YPZGCTTM31 732 01/11/2024                  Assessment and Plan   Diagnoses and all orders for this visit:    1. Numbness and tingling in both hands (Primary)  -     Vitamin B12; Future  -     Immunofixation, Serum  -     Immunoglobulin Free LT Chains Blood  -     Protein Elec + Interp, Serum  -     RPR  -     Sedimentation Rate  -     TSH Rfx On Abnormal To Free T4  -     Vitamin B1, Whole Blood  -     Vitamin B6  -     Vitamin E  -     Heavy Metals, Blood  -     C-reactive Protein  -     Copper, Serum  -     Celiac Disease Antibody Screen    2. Numbness and " tingling of both legs  -     Vitamin B12; Future  -     Immunofixation, Serum  -     Immunoglobulin Free LT Chains Blood  -     Protein Elec + Interp, Serum  -     RPR  -     Sedimentation Rate  -     TSH Rfx On Abnormal To Free T4  -     Vitamin B1, Whole Blood  -     Vitamin B6  -     Vitamin E  -     Heavy Metals, Blood  -     C-reactive Protein  -     Copper, Serum  -     Celiac Disease Antibody Screen    3. Chronic neck pain  -     MRI Cervical Spine With & Without Contrast; Future    4. Chronic left-sided low back pain without sciatica  -     MRI lumbar spine w wo contrast; Future      EMG for bilateral upper extremities was normal with no abnormalities noted.  We will check peripheral neuropathy labs to rule out reversible causes of his numbness and tingling of his extremities.  Will also check MRI cervical spine and MRI lumbar spine to rule out any stenosis, compression or disc disease.  I have requested records of his previous left lower extremity EMG as well as the results from his scheduled right lower extremity EMG on Monday.    We will see Elijah back in 3 months, sooner if symptoms warrant.     LUISITO Jovel  [facility]   03/22/24       I spent 40 minutes caring for Elijah on this date of service. This time includes time spent by me in the following activities:preparing for the visit, reviewing tests, obtaining and/or reviewing a separately obtained history, performing a medically appropriate examination and/or evaluation , counseling and educating the patient/family/caregiver, ordering medications, tests, or procedures, referring and communicating with other health care professionals , documenting information in the medical record, independently interpreting results and communicating that information with the patient/family/caregiver, and care coordination  Follow Up   Return in about 3 months (around 6/22/2024).  Patient was given instructions and counseling regarding his  condition or for health maintenance advice. Please see specific information pulled into the AVS if appropriate.       Dictated using Dragon Dictation

## 2024-03-23 LAB
GLIADIN PEPTIDE IGA SER-ACNC: 3 UNITS (ref 0–19)
IGA SERPL-MCNC: 268 MG/DL (ref 90–386)
TTG IGA SER-ACNC: <2 U/ML (ref 0–3)

## 2024-03-25 LAB
ALBUMIN SERPL ELPH-MCNC: 3.9 G/DL (ref 2.9–4.4)
ALBUMIN/GLOB SERPL: 1.2 {RATIO} (ref 0.7–1.7)
ALPHA1 GLOB SERPL ELPH-MCNC: 0.3 G/DL (ref 0–0.4)
ALPHA2 GLOB SERPL ELPH-MCNC: 0.7 G/DL (ref 0.4–1)
B-GLOBULIN SERPL ELPH-MCNC: 1.3 G/DL (ref 0.7–1.3)
GAMMA GLOB SERPL ELPH-MCNC: 0.9 G/DL (ref 0.4–1.8)
GLOBULIN SER CALC-MCNC: 3.2 G/DL (ref 2.2–3.9)
IGA SERPL-MCNC: 273 MG/DL (ref 90–386)
IGG SERPL-MCNC: 937 MG/DL (ref 603–1613)
IGM SERPL-MCNC: 136 MG/DL (ref 20–172)
KAPPA LC FREE SER-MCNC: 17.1 MG/L (ref 3.3–19.4)
KAPPA LC FREE/LAMBDA FREE SER: 1.01 {RATIO} (ref 0.26–1.65)
LABORATORY COMMENT REPORT: NORMAL
LAMBDA LC FREE SERPL-MCNC: 17 MG/L (ref 5.7–26.3)
M PROTEIN SERPL ELPH-MCNC: NORMAL G/DL
PROT PATTERN SERPL ELPH-IMP: NORMAL
PROT PATTERN SERPL IFE-IMP: NORMAL
PROT SERPL-MCNC: 7.1 G/DL (ref 6–8.5)

## 2024-03-26 LAB
COPPER SERPL-MCNC: 120 UG/DL (ref 69–132)
VIT B1 BLD-SCNC: 152 NMOL/L (ref 66.5–200)

## 2024-03-27 LAB — PYRIDOXAL PHOS SERPL-MCNC: 12.9 UG/L (ref 3.4–65.2)

## 2024-03-29 LAB
ARSENIC BLD-MCNC: <1 UG/L (ref 0–9)
LEAD BLDV-MCNC: 1.9 UG/DL (ref 0–3.4)
MERCURY BLD-MCNC: <1 UG/L (ref 0–14.9)

## 2024-03-30 LAB
A-TOCOPHEROL VIT E SERPL-MCNC: 9.5 MG/L (ref 7–25.1)
GAMMA TOCOPHEROL SERPL-MCNC: 0.8 MG/L (ref 0.5–5.5)

## 2024-04-15 ENCOUNTER — TELEPHONE (OUTPATIENT)
Dept: NEUROLOGY | Facility: CLINIC | Age: 49
End: 2024-04-15
Payer: COMMERCIAL

## 2024-04-15 NOTE — TELEPHONE ENCOUNTER
Provider: PAOLA CHRISTOPHER    Caller: MISHA WITH [facility]    Phone Number: [phone]    Reason for Call: STATES PATIENT'S MRI IS SCHEDULED FOR 4/30/24 AND MRI AUTH EXPIRES 4/24/24. STATES PROVIDER WILL NEED TO REQUEST A NEW AUTH. PLEASE REVIEW, THANK YOU.

## 2024-04-29 NOTE — TELEPHONE ENCOUNTER
JESIKA WITH [facility] CALLED AND STATES THAT PT IS DUE FOR MRI TOMORROW  AND PRIOR AUTH IS . JESIKA STATES THAT THEY ARE ONLY GOOD FOR 30 DAYS. JESIKA ASKED FOR A CALL BACK.  PHONE# [phone] ex [phone]

## 2024-06-14 ENCOUNTER — APPOINTMENT (OUTPATIENT)
Dept: CARDIOLOGY | Facility: HOSPITAL | Age: 49
End: 2024-06-14
Payer: COMMERCIAL

## 2024-06-14 ENCOUNTER — HOSPITAL ENCOUNTER (EMERGENCY)
Facility: HOSPITAL | Age: 49
Discharge: HOME | End: 2024-06-14
Attending: EMERGENCY MEDICINE
Payer: COMMERCIAL

## 2024-06-14 VITALS
DIASTOLIC BLOOD PRESSURE: 98 MMHG | TEMPERATURE: 97.6 F | HEART RATE: 104 BPM | SYSTOLIC BLOOD PRESSURE: 146 MMHG | RESPIRATION RATE: 18 BRPM | OXYGEN SATURATION: 98 %

## 2024-06-14 DIAGNOSIS — R60.0 LEG EDEMA, RIGHT: Primary | ICD-10-CM

## 2024-06-14 LAB
ANION GAP SERPL CALCULATED.3IONS-SCNC: 8.8 MMOL/L (ref 5–15)
BASOPHILS # BLD AUTO: 0.05 10*3/MM3 (ref 0–0.2)
BASOPHILS NFR BLD AUTO: 0.9 % (ref 0–1.5)
BH CV LOW VAS LEFT COMMON FEMORAL SPONT: 1
BH CV LOW VAS RIGHT VARICOSITY AK VESSEL: 1
BH CV LOWER VASCULAR LEFT COMMON FEMORAL AUGMENT: NORMAL
BH CV LOWER VASCULAR LEFT COMMON FEMORAL COMPETENT: NORMAL
BH CV LOWER VASCULAR LEFT COMMON FEMORAL COMPRESS: NORMAL
BH CV LOWER VASCULAR LEFT COMMON FEMORAL PHASIC: NORMAL
BH CV LOWER VASCULAR LEFT COMMON FEMORAL SPONT: NORMAL
BH CV LOWER VASCULAR RIGHT COMMON FEMORAL AUGMENT: NORMAL
BH CV LOWER VASCULAR RIGHT COMMON FEMORAL COMPETENT: NORMAL
BH CV LOWER VASCULAR RIGHT COMMON FEMORAL COMPRESS: NORMAL
BH CV LOWER VASCULAR RIGHT COMMON FEMORAL PHASIC: NORMAL
BH CV LOWER VASCULAR RIGHT COMMON FEMORAL SPONT: NORMAL
BH CV LOWER VASCULAR RIGHT DISTAL FEMORAL COMPRESS: NORMAL
BH CV LOWER VASCULAR RIGHT GASTRONEMIUS COMPRESS: NORMAL
BH CV LOWER VASCULAR RIGHT GREATER SAPH AK COMPRESS: NORMAL
BH CV LOWER VASCULAR RIGHT GREATER SAPH BK COMPRESS: NORMAL
BH CV LOWER VASCULAR RIGHT LESSER SAPH COMPRESS: NORMAL
BH CV LOWER VASCULAR RIGHT MID FEMORAL AUGMENT: NORMAL
BH CV LOWER VASCULAR RIGHT MID FEMORAL COMPETENT: NORMAL
BH CV LOWER VASCULAR RIGHT MID FEMORAL COMPRESS: NORMAL
BH CV LOWER VASCULAR RIGHT MID FEMORAL PHASIC: NORMAL
BH CV LOWER VASCULAR RIGHT MID FEMORAL SPONT: NORMAL
BH CV LOWER VASCULAR RIGHT PERONEAL COMPRESS: NORMAL
BH CV LOWER VASCULAR RIGHT POPLITEAL AUGMENT: NORMAL
BH CV LOWER VASCULAR RIGHT POPLITEAL COMPETENT: NORMAL
BH CV LOWER VASCULAR RIGHT POPLITEAL COMPRESS: NORMAL
BH CV LOWER VASCULAR RIGHT POPLITEAL PHASIC: NORMAL
BH CV LOWER VASCULAR RIGHT POPLITEAL SPONT: NORMAL
BH CV LOWER VASCULAR RIGHT POSTERIOR TIBIAL COMPRESS: NORMAL
BH CV LOWER VASCULAR RIGHT PROFUNDA FEMORAL COMPRESS: NORMAL
BH CV LOWER VASCULAR RIGHT PROXIMAL FEMORAL COMPRESS: NORMAL
BH CV LOWER VASCULAR RIGHT SAPHENOFEMORAL JUNCTION COMPRESS: NORMAL
BH CV LOWER VASCULAR RIGHT VARICOSITY AK COMPRESS: NORMAL
BH CV LOWER VASCULAR RIGHT VARICOSITY AK THROMBUS: NORMAL
BH CV VAS PRELIMINARY FINDINGS SCRIPTING: 1
BUN SERPL-MCNC: 16 MG/DL (ref 6–20)
BUN/CREAT SERPL: 18.8 (ref 7–25)
CALCIUM SPEC-SCNC: 8.9 MG/DL (ref 8.6–10.5)
CHLORIDE SERPL-SCNC: 102 MMOL/L (ref 98–107)
CO2 SERPL-SCNC: 27.2 MMOL/L (ref 22–29)
CREAT SERPL-MCNC: 0.85 MG/DL (ref 0.76–1.27)
DEPRECATED RDW RBC AUTO: 40.6 FL (ref 37–54)
EGFRCR SERPLBLD CKD-EPI 2021: 105.2 ML/MIN/1.73
EOSINOPHIL # BLD AUTO: 0.13 10*3/MM3 (ref 0–0.4)
EOSINOPHIL NFR BLD AUTO: 2.3 % (ref 0.3–6.2)
ERYTHROCYTE [DISTWIDTH] IN BLOOD BY AUTOMATED COUNT: 13.3 % (ref 12.3–15.4)
GLUCOSE SERPL-MCNC: 99 MG/DL (ref 65–99)
HCT VFR BLD AUTO: 46.6 % (ref 37.5–51)
HGB BLD-MCNC: 15.7 G/DL (ref 13–17.7)
IMM GRANULOCYTES # BLD AUTO: 0.02 10*3/MM3 (ref 0–0.05)
IMM GRANULOCYTES NFR BLD AUTO: 0.4 % (ref 0–0.5)
INR PPP: 0.98 (ref 0.9–1.1)
LYMPHOCYTES # BLD AUTO: 0.8 10*3/MM3 (ref 0.7–3.1)
LYMPHOCYTES NFR BLD AUTO: 14.3 % (ref 19.6–45.3)
MCH RBC QN AUTO: 28.9 PG (ref 26.6–33)
MCHC RBC AUTO-ENTMCNC: 33.7 G/DL (ref 31.5–35.7)
MCV RBC AUTO: 85.7 FL (ref 79–97)
MONOCYTES # BLD AUTO: 0.76 10*3/MM3 (ref 0.1–0.9)
MONOCYTES NFR BLD AUTO: 13.6 % (ref 5–12)
NEUTROPHILS NFR BLD AUTO: 3.83 10*3/MM3 (ref 1.7–7)
NEUTROPHILS NFR BLD AUTO: 68.5 % (ref 42.7–76)
NRBC BLD AUTO-RTO: 0 /100 WBC (ref 0–0.2)
PLATELET # BLD AUTO: 220 10*3/MM3 (ref 140–450)
PMV BLD AUTO: 10 FL (ref 6–12)
POTASSIUM SERPL-SCNC: 4.3 MMOL/L (ref 3.5–5.2)
PROTHROMBIN TIME: 13.2 SECONDS (ref 11.7–14.2)
RBC # BLD AUTO: 5.44 10*6/MM3 (ref 4.14–5.8)
SODIUM SERPL-SCNC: 138 MMOL/L (ref 136–145)
WBC NRBC COR # BLD AUTO: 5.59 10*3/MM3 (ref 3.4–10.8)

## 2024-06-14 PROCEDURE — 93971 EXTREMITY STUDY: CPT | Performed by: SURGERY

## 2024-06-14 PROCEDURE — 85025 COMPLETE CBC W/AUTO DIFF WBC: CPT | Performed by: NURSE PRACTITIONER

## 2024-06-14 PROCEDURE — 99284 EMERGENCY DEPT VISIT MOD MDM: CPT

## 2024-06-14 PROCEDURE — 85610 PROTHROMBIN TIME: CPT | Performed by: NURSE PRACTITIONER

## 2024-06-14 PROCEDURE — 80048 BASIC METABOLIC PNL TOTAL CA: CPT | Performed by: NURSE PRACTITIONER

## 2024-06-14 PROCEDURE — 36415 COLL VENOUS BLD VENIPUNCTURE: CPT

## 2024-06-14 PROCEDURE — 93971 EXTREMITY STUDY: CPT

## 2024-06-14 NOTE — ED PROVIDER NOTES
SHARED VISIT: This visit was performed by BOTH a physician and an APC. The substantive portion of the medical decision making was performed by this attesting physician who made or approved the management plan and takes responsibility for patient management. All studies in the APC note (if performed) were independently interpreted by me.     The COLIN and I have discussed this patient's history, physical exam, and treatment plan.  I have reviewed the documentation and personally had a face to face interaction with the patient. I affirm the documentation and agree with the treatment and plan.  The attached note describes my personal findings.      I provided a substantive portion of the care of the patient.  I personally performed the physical exam in its entirety, and below are my findings.     Brief history of present illness: 49-year-old male with a history of DVT who has been taking his Eliquis at a reduced dose secondary to recurrent bleeding with small injuries presents with atraumatic right inner thigh pain.  No chest pain or shortness of breath reported.    Physical exam:    /98 (BP Location: Right arm, Patient Position: Sitting)   Pulse 104   Temp 97.6 °F (36.4 °C) (Tympanic)   Resp 18   SpO2 98%       Physical Exam   Constitutional: No distress.  Nontoxic  HENT:  Head: Normocephalic and atraumatic.   Oropharynx: Mucous membranes are moist.   Eyes: . No scleral icterus.   Neck: Normal range of motion. Neck supple.   Cardiovascular: Pink warm and well perfused throughout.    Pulmonary/Chest: No respiratory distress.    Musculoskeletal: Moves all extremities equally.  No palpable cord in right thigh  Neurological: Alert and oriented.  Speech fluent and easily intelligible  Skin: Skin is pink, warm, and dry.   Psychiatric: Mood and affect normal.   Nursing note and vitals reviewed.        MDM:  My diagnosis for lower extremity pain and injury includes but is not limited to hip fracture, femur fracture,  hip dislocation, hip contusion, hip sprain, hip strain, pelvic fracture, ischio-tibial band pain, ischio-tibial band bursitis, knee sprain, patella dislocation, knee dislocation, internal derangement of knee, fractures of the femur, tibia, fibula, ankle, foot and digits, ankle sprain, ankle dislocation, Lisfranc fracture, fracture dislocations of the digits, pulmonary embolism, claudication, peripheral vascular disease, gout, osteoarthritis, rheumatoid arthritis, bursitis, septic joint, poly-rheumatica, polyarthralgia and other inflammatory or infectious disease processes.      Please note that portions of this were completed with a voice recognition program.       Note Disclaimer: At [facility], we believe that sharing information builds trust and better relationships. You are receiving this note because you are receiving care at [facility] or recently visited. It is possible you will see health information before a provider has talked with you about it. This kind of information can be easy to misunderstand. To help you fully understand what it means for your health, we urge you to discuss this note with your provider.     Ady Regan MD  06/14/24 9520

## 2024-06-14 NOTE — ED NOTES
Pt arrived to ER from home, c/o right thigh pain, pt states he has a history of blood clots.  Pt said he noticed discomfort yesterday at work and after getting cleaned up, he noticed a bruise on his thigh.

## 2024-06-14 NOTE — ED PROVIDER NOTES
EMERGENCY DEPARTMENT ENCOUNTER  Room Number:  06/06  PCP: Provider, No Known  Independent Historians: Patient      HPI:  Chief Complaint: had concerns including Leg Pain (Leg pain - right leg ).     A complete HPI/ROS/PMH/PSH/SH/FH are unobtainable due to:     Chronic or social conditions impacting patient care (Social Determinants of Health):       Context: Elijah Mo is a pleasant afebrile 49 y.o.  male with a medical history of DVT/PE who presents to the ED c/o acute R leg pain    Has been off testosterone x 1 year  States diagnosed with a dvt in his r calf and prescribed eliquis but only taking 5mg, last dose   Also has a hx of PE  Follows with cardiology and pcp for this  States works as a  so he has only been taking a half dose of his anticoagluation almost since he started the medicine  Pt stateshis r leg is always swollen  Yesterday he stateshis r thigh started hurtiing and was worried he could have another dvt    Review of prior external notes (non-ED) -and- Review of prior external test results outside of this encounter:  office visit 3/22/24 seen by aprn with neurology for paresthesias, MRIs ordered    Prescription drug monitoring program review:         PAST MEDICAL HISTORY  Active Ambulatory Problems     Diagnosis Date Noted    Benign essential HTN 08/22/2022    Acute superficial venous thrombosis of lower extremity 01/10/2019    Vitamin D deficiency 08/12/2015    Cervical disc disorder 03/11/2014    Arthritis 09/05/2017     Resolved Ambulatory Problems     Diagnosis Date Noted    No Resolved Ambulatory Problems     Past Medical History:   Diagnosis Date    Hypertension     Numbness and tingling          PAST SURGICAL HISTORY  Past Surgical History:   Procedure Laterality Date    ANKLE SURGERY      KNEE ARTHROSCOPY           FAMILY HISTORY  Family History   Problem Relation Age of Onset    Heart attack Father          SOCIAL HISTORY  Social History     Socioeconomic  History    Marital status:    Tobacco Use    Smoking status: Never    Smokeless tobacco: Never   Vaping Use    Vaping status: Never Used   Substance and Sexual Activity    Alcohol use: Yes     Comment: occasional     Drug use: Never    Sexual activity: Defer         ALLERGIES  Patient has no known allergies.      REVIEW OF SYSTEMS  Review of Systems  Included in HPI  All systems reviewed and negative except for those discussed in HPI.      PHYSICAL EXAM    I have reviewed the triage vital signs and nursing notes.    ED Triage Vitals   Temp Heart Rate Resp BP SpO2   06/14/24 0612 06/14/24 0612 06/14/24 0612 06/14/24 0615 06/14/24 0612   97.6 °F (36.4 °C) 104 18 146/98 98 %      Temp src Heart Rate Source Patient Position BP Location FiO2 (%)   06/14/24 0612 06/14/24 0612 06/14/24 0615 06/14/24 0615 --   Tympanic Monitor Sitting Right arm        Physical Exam  GENERAL: alert, no acute distress  SKIN: Warm, dry and intact  HENT: Normocephalic, atraumatic  EYES: no scleral icterus  CV: regular rhythm, regular rate, no murmur  RESPIRATORY: normal effort, lungs clear  ABDOMEN: soft, nontender, nondistended  MUSCULOSKELETAL: no deformity, varicosities to right anterior upper leg, 1+ edema to RLE  NEURO: alert, moves all extremities, follows commands            LAB RESULTS  Recent Results (from the past 24 hour(s))   Basic Metabolic Panel    Collection Time: 06/14/24  7:21 AM    Specimen: Blood   Result Value Ref Range    Glucose 99 65 - 99 mg/dL    BUN 16 6 - 20 mg/dL    Creatinine 0.85 0.76 - 1.27 mg/dL    Sodium 138 136 - 145 mmol/L    Potassium 4.3 3.5 - 5.2 mmol/L    Chloride 102 98 - 107 mmol/L    CO2 27.2 22.0 - 29.0 mmol/L    Calcium 8.9 8.6 - 10.5 mg/dL    BUN/Creatinine Ratio 18.8 7.0 - 25.0    Anion Gap 8.8 5.0 - 15.0 mmol/L    eGFR 105.2 >60.0 mL/min/1.73   Protime-INR    Collection Time: 06/14/24  7:21 AM    Specimen: Blood   Result Value Ref Range    Protime 13.2 11.7 - 14.2 Seconds    INR 0.98 0.90 -  1.10   CBC Auto Differential    Collection Time: 06/14/24  7:21 AM    Specimen: Blood   Result Value Ref Range    WBC 5.59 3.40 - 10.80 10*3/mm3    RBC 5.44 4.14 - 5.80 10*6/mm3    Hemoglobin 15.7 13.0 - 17.7 g/dL    Hematocrit 46.6 37.5 - 51.0 %    MCV 85.7 79.0 - 97.0 fL    MCH 28.9 26.6 - 33.0 pg    MCHC 33.7 31.5 - 35.7 g/dL    RDW 13.3 12.3 - 15.4 %    RDW-SD 40.6 37.0 - 54.0 fl    MPV 10.0 6.0 - 12.0 fL    Platelets 220 140 - 450 10*3/mm3    Neutrophil % 68.5 42.7 - 76.0 %    Lymphocyte % 14.3 (L) 19.6 - 45.3 %    Monocyte % 13.6 (H) 5.0 - 12.0 %    Eosinophil % 2.3 0.3 - 6.2 %    Basophil % 0.9 0.0 - 1.5 %    Immature Grans % 0.4 0.0 - 0.5 %    Neutrophils, Absolute 3.83 1.70 - 7.00 10*3/mm3    Lymphocytes, Absolute 0.80 0.70 - 3.10 10*3/mm3    Monocytes, Absolute 0.76 0.10 - 0.90 10*3/mm3    Eosinophils, Absolute 0.13 0.00 - 0.40 10*3/mm3    Basophils, Absolute 0.05 0.00 - 0.20 10*3/mm3    Immature Grans, Absolute 0.02 0.00 - 0.05 10*3/mm3    nRBC 0.0 0.0 - 0.2 /100 WBC   Duplex Venous Lower Extremity - Right CAR    Collection Time: 06/14/24  8:08 AM   Result Value Ref Range    Right Varicosity AK Vessel 1.0     Right Common Femoral Spont Y     Right Common Femoral Competent Y     Right Common Femoral Phasic Y     Right Common Femoral Compress C     Right Common Femoral Augment Y     Right Saphenofemoral Junction Compress C     Right Profunda Femoral Compress C     Right Proximal Femoral Compress C     Right Mid Femoral Spont Y     Right Mid Femoral Competent Y     Right Mid Femoral Phasic Y     Right Mid Femoral Compress C     Right Mid Femoral Augment Y     Right Distal Femoral Compress C     Right Popliteal Spont Y     Right Popliteal Competent Y     Right Popliteal Phasic Y     Right Popliteal Compress C     Right Popliteal Augment Y     Right Posterior Tibial Compress C     Right Peroneal Compress C     Right Gastronemius Compress C     Right Greater Saph AK Compress C     Right Greater Saph BK Compress  C     Right Lesser Saph Compress C     Right Varicosity AK Compress P     Right Varicosity AK Thrombus C     Left Common Femoral Spont 1.0     Left Common Femoral Spont Y     Left Common Femoral Competent N     Left Common Femoral Phasic Y     Left Common Femoral Compress C     Left Common Femoral Augment Y     BH CV VAS PRELIMINARY FINDINGS SCRIPTING 1.0          RADIOLOGY  Duplex Venous Lower Extremity - Right CAR    Result Date: 6/14/2024    Chronic right lower extremity superficial thrombophlebitis noted in the varicosity (above knee).   There was deep venous valvular incompetence noted in the left common femoral.   All other right sided veins appeared normal.        MEDICATIONS GIVEN IN ER  Medications - No data to display      ORDERS PLACED DURING THIS VISIT:  Orders Placed This Encounter   Procedures    Basic Metabolic Panel    Protime-INR    CBC Auto Differential    CBC & Differential         OUTPATIENT MEDICATION MANAGEMENT:  No current Epic-ordered facility-administered medications on file.     Current Outpatient Medications Ordered in Epic   Medication Sig Dispense Refill    allopurinol (ZYLOPRIM) 300 MG tablet Take 1 tablet by mouth Daily.      amLODIPine (NORVASC) 10 MG tablet Take 1 tablet by mouth Daily.      Cholecalciferol (Vitamin D-3) 125 MCG (5000 UT) tablet 80 tablets Daily.      Eliquis 5 MG tablet tablet Take 1 tablet by mouth Every 12 (Twelve) Hours. 60 tablet 11    Escitalopram Oxalate (LEXAPRO PO) Take  by mouth.      fluticasone (FLONASE) 50 MCG/ACT nasal spray 2 sprays by Each Nare route Daily.      losartan (Cozaar) 25 MG tablet Take 1 tablet by mouth Daily. 90 tablet 3    magnesium oxide (MAG-OX) 400 MG tablet Take 1 tablet by mouth Daily.      montelukast (SINGULAIR) 10 MG tablet Take 1 tablet by mouth Daily.      multivitamin with minerals (CENTRUM SILVER PO) Take 1 tablet by mouth Daily.      olmesartan (BENICAR) 40 MG tablet Take 1 tablet by mouth Daily.            PROCEDURES  Procedures            PROGRESS, DATA ANALYSIS, CONSULTS, AND MEDICAL DECISION MAKING  All labs have been independently interpreted by me.  All radiology studies have been reviewed by me. All EKG's have been independently viewed and interpreted by me.  Discussion below represents my analysis of pertinent findings related to patient's condition, differential diagnosis, treatment plan and final disposition.    Differential diagnosis includes but is not limited to varicosity, acute dvt, chronic DVT, chf.                     ED Course as of 06/14/24 0855   Fri Jun 14, 2024 0816 Phone call with [employer].  Discussed the patient, relevant history, exam, diagnostics, ED findings/progress, and concerns. They advise patient is negative for acute DVT, noted chronic superficial vein thrombus present   [AH]      ED Course User Index  [AH] Taylor Barlow APRN             AS OF 08:55 EDT VITALS:    BP - 146/98  HR - 104  TEMP - 97.6 °F (36.4 °C) (Tympanic)  O2 SATS - 98%    COMPLEXITY OF CARE  The patient requires admission.      DIAGNOSIS  Final diagnoses:   Leg edema, right         DISPOSITION  ED Disposition       ED Disposition   Discharge    Condition   Stable    Comment   --                Please note that portions of this document were completed with a voice recognition program.    Note Disclaimer: At [facility], we believe that sharing information builds trust and better relationships. You are receiving this note because you recently visited [facility]. It is possible you will see health information before a provider has talked with you about it. This kind of information can be easy to misunderstand. To help you fully understand what it means for your health, we urge you to discuss this note with your provider.         Taylor Barlow APRN  06/14/24 0855

## 2024-06-14 NOTE — Clinical Note
[facility] EMERGENCY DEPARTMENT  [address] [facility] [address]  Phone: [phone]    Elijah Mo was seen and treated in our emergency department on 6/14/2024.  He may return to work on 06/15/2024.         Thank you for choosing [facility].    Blanka Aviles RN

## 2024-06-27 ENCOUNTER — TELEPHONE (OUTPATIENT)
Dept: NEUROLOGY | Facility: CLINIC | Age: 49
End: 2024-06-27
Payer: COMMERCIAL

## 2024-06-28 NOTE — PROGRESS NOTES
"Chief Complaint  Superficial thromboplebitis    Subjective        Elijah Mo presents to [facility] VASCULAR SURGERY  History of Present Illness  The patient is a 49-year-old gentleman with previous bilateral DVTs with pulmonary embolism, on chronic anticoagulation, with right leg pain and varicose veins.  He was seen in the emergency room on June 14, 2024 and a venous scan demonstrated chronic superficial thrombophlebitic changes noted in  varices above the knee.  Deep venous insufficiency noted in the common femoral vein as well.  He has right leg swelling which worsens as the day progresses.  He wears compression stockings with no significant benefit.  He is having more pain in his varices.  His current Eliquis dose is 5 mg twice a day.    Past History:  Medical History: has a past medical history of Hypertension and Numbness and tingling.   Surgical History: has a past surgical history that includes Ankle surgery and Knee Arthroscopy.   Family History: family history includes Heart attack in his father.   Social History: reports that he has never smoked. He has never used smokeless tobacco. He reports current alcohol use. He reports that he does not use drugs.    (Not in a hospital admission)     Allergies: Patient has no known allergies.   Objective   Vital Signs:  /75   Pulse 91   Ht 175.3 cm (69.02\")   Wt 107 kg (235 lb)   BMI 34.68 kg/m²   Estimated body mass index is 34.68 kg/m² as calculated from the following:    Height as of this encounter: 175.3 cm (69.02\").    Weight as of this encounter: 107 kg (235 lb).         Elijah Mo  reports that he has never smoked. He has never used smokeless tobacco.     Physical Exam large tortuous varices in the right anteromedial thigh beginning high thigh and coursing down to below the level of the knee.  These of varices are as large as 1.5 cm in diameter.  Multiple clusters of spider telangiectasia below the knee with 1+ " edema.  No edema on the left.  Result Review :        Data reviewed : Venous scan from June 14, 2024, findings as described above.             Assessment and Plan     Diagnoses and all orders for this visit:    1. Superficial thrombophlebitis of right leg (Primary)  -     Venous w Reflux Lower Extremity - Unilateral CAR; Future    2. Varicose veins of right lower extremity with other complications  -     Venous w Reflux Lower Extremity - Unilateral CAR; Future    3. Venous (peripheral) insufficiency  -     Venous w Reflux Lower Extremity - Unilateral CAR; Future    4. Chronic anticoagulation    Other orders  -     apixaban (ELIQUIS) 2.5 MG tablet tablet; Take 1 tablet by mouth 2 (Two) Times a Day.  Dispense: 60 tablet; Refill: 6    The pathophysiology of venous insufficiency and varicose veins, as well as superficial thrombophlebitis, were discussed in detail with the patient, including the natural history of these processes.  Treatment options were covered.  With his leg swelling and varicosities I believe he would benefit from a venous scan with mapping to see if there is any option that may be available to ablate refluxing superficial truncal veins which could help with his swelling and pain.  In addition, he has not had any issues with a deep venous thrombosis for quite some time and I believe we can decrease his Eliquis dose from 5 mg twice a day to 2.5 mg twice a day since he is having significant issues with bruising part of this related to the work that he does.  I will reassess him after his venous scan with reflux and mapping.         Follow Up     Return in about 4 weeks (around 7/31/2024) for After venous scan with reflux and mapping..  Patient was given instructions and counseling regarding his condition or for health maintenance advice. Please see specific information pulled into the AVS if appropriate.

## 2024-07-03 ENCOUNTER — OFFICE VISIT (OUTPATIENT)
Age: 49
End: 2024-07-03
Payer: COMMERCIAL

## 2024-07-03 ENCOUNTER — OFFICE VISIT (OUTPATIENT)
Dept: NEUROLOGY | Facility: CLINIC | Age: 49
End: 2024-07-03
Payer: COMMERCIAL

## 2024-07-03 VITALS
WEIGHT: 239 LBS | SYSTOLIC BLOOD PRESSURE: 108 MMHG | DIASTOLIC BLOOD PRESSURE: 84 MMHG | OXYGEN SATURATION: 94 % | HEART RATE: 89 BPM | RESPIRATION RATE: 20 BRPM | BODY MASS INDEX: 35.4 KG/M2 | HEIGHT: 69 IN

## 2024-07-03 VITALS
HEART RATE: 91 BPM | WEIGHT: 235 LBS | BODY MASS INDEX: 34.8 KG/M2 | SYSTOLIC BLOOD PRESSURE: 106 MMHG | HEIGHT: 69 IN | DIASTOLIC BLOOD PRESSURE: 75 MMHG

## 2024-07-03 DIAGNOSIS — I87.2 VENOUS (PERIPHERAL) INSUFFICIENCY: ICD-10-CM

## 2024-07-03 DIAGNOSIS — R20.0 NUMBNESS AND TINGLING IN BOTH HANDS: ICD-10-CM

## 2024-07-03 DIAGNOSIS — Z79.01 CHRONIC ANTICOAGULATION: ICD-10-CM

## 2024-07-03 DIAGNOSIS — M54.2 CHRONIC NECK PAIN: ICD-10-CM

## 2024-07-03 DIAGNOSIS — R20.2 NUMBNESS AND TINGLING OF BOTH LEGS: ICD-10-CM

## 2024-07-03 DIAGNOSIS — I83.891 VARICOSE VEINS OF RIGHT LOWER EXTREMITY WITH OTHER COMPLICATIONS: ICD-10-CM

## 2024-07-03 DIAGNOSIS — R20.0 NUMBNESS AND TINGLING OF BOTH LEGS: ICD-10-CM

## 2024-07-03 DIAGNOSIS — G89.29 CHRONIC LEFT-SIDED LOW BACK PAIN WITHOUT SCIATICA: ICD-10-CM

## 2024-07-03 DIAGNOSIS — G57.51 TARSAL TUNNEL SYNDROME, RIGHT: Primary | ICD-10-CM

## 2024-07-03 DIAGNOSIS — I80.01 SUPERFICIAL THROMBOPHLEBITIS OF RIGHT LEG: Primary | ICD-10-CM

## 2024-07-03 DIAGNOSIS — R20.2 NUMBNESS AND TINGLING IN BOTH HANDS: ICD-10-CM

## 2024-07-03 DIAGNOSIS — M54.50 CHRONIC LEFT-SIDED LOW BACK PAIN WITHOUT SCIATICA: ICD-10-CM

## 2024-07-03 DIAGNOSIS — G89.29 CHRONIC NECK PAIN: ICD-10-CM

## 2024-07-03 NOTE — PROGRESS NOTES
DOS: 7/3/2024  NAME: Elijah Mo   : 1975  PCP: Provider, No Known    Chief Complaint   Patient presents with    Numbness and tingling in both hands      SUBJECTIVE  Neurological Problem:  49 y.o. RHW male with a past medical history of hypertension, history of cervical disc disorder, history of superficial DVT of lower extremity, arthritis, vitamin D deficiency . They are seen in follow up today for paresthesias. A summary of the history was taken from the previous note with additions/modifications as indicated. He is unaccompanied.    Interval History:   **For detailed interval history see progress note dated 3/22/2024.    Mr. Mo has followed with our clinic since 2023 for paresthesias of his hands, previously seen by Dr. Eduardo and more recently by myself in 2024. He has a history of chronic neck and back pain along with left tarsal tunnel syndrome. He has had EMGs of his bilateral upper and left lower extremities completed. The upper extremity EMG was a normal study. 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 At his last visit I ordered peripheral neuropathy labs to rule out reversible causes and a MRI C-spine w/wo and MRI L-spine w/wo.     He presents today in follow-up and reports that his symptoms have mostly resolved and he really only experiences issues in his right ankle, he feels related to the tarsal tunnel syndrome. He tells me he is using a neck stretching device, similar to traction, every one to two weeks and it helps decrease his chronic neck pain and stiffness. He says the paresthesias of his upper extremities are much improved. He also has been seen by a chiropractor who did lower back work and he has found that helpful as well. He denies paresthesias to his lower extremities. He says he experiences stabbing pain in his right heel especially after getting his steps in  towards the end of the day. He says he has plans for surgery on his foot in the near future.He denies any falls, no weakness, no bowel or bladder involvement. MRI cervical spine w/wo showed mild multilevel degenerative changes with mild central canal stenosis at C4-7 and C7-T1. MRI lumbar spine w/wo show no acute findings, chronic changes of the thoracic lumbar junction and L5-S1 spondylosis. Peripheral neuropathy labs were unrevealing for any cause to his symptoms.     Review of Systems   Musculoskeletal:  Negative for gait problem.   Neurological:  Positive for numbness. Negative for dizziness, tremors, seizures, syncope, facial asymmetry, speech difficulty, weakness, light-headedness and headaches.   Psychiatric/Behavioral:  Negative for agitation, behavioral problems, confusion, decreased concentration, dysphoric mood, hallucinations, self-injury, sleep disturbance and suicidal ideas. The patient is not nervous/anxious and is not hyperactive.         The following portions of the patient's history were reviewed and updated as appropriate: allergies, current medications, past family history, past medical history, past social history, past surgical history and problem list.    Current Medications:   Current Outpatient Medications:     allopurinol (ZYLOPRIM) 300 MG tablet, Take 1 tablet by mouth Daily., Disp: , Rfl:     amLODIPine (NORVASC) 10 MG tablet, Take 1 tablet by mouth Daily., Disp: , Rfl:     apixaban (ELIQUIS) 2.5 MG tablet tablet, Take 1 tablet by mouth 2 (Two) Times a Day., Disp: 60 tablet, Rfl: 6    Cholecalciferol (Vitamin D-3) 125 MCG (5000 UT) tablet, 80 tablets Daily., Disp: , Rfl:     fluticasone (FLONASE) 50 MCG/ACT nasal spray, 2 sprays by Each Nare route Daily., Disp: , Rfl:     montelukast (SINGULAIR) 10 MG tablet, Take 1 tablet by mouth Daily., Disp: , Rfl:     multivitamin with minerals (CENTRUM SILVER PO), Take 1 tablet by mouth Daily., Disp: , Rfl:     Eliquis 5 MG tablet tablet, Take 1  "tablet by mouth Every 12 (Twelve) Hours. (Patient not taking: Reported on 7/3/2024), Disp: 60 tablet, Rfl: 11    Escitalopram Oxalate (LEXAPRO PO), Take  by mouth. (Patient not taking: Reported on 7/3/2024), Disp: , Rfl:     Ibuprofen 3 %, Gabapentin 10 %, Baclofen 2 %, lidocaine 4 %, Ketamine HCl 4 %, Apply 1-2 g topically to the appropriate area as directed 3 (Three) to 4 (Four) times daily., Disp: 90 g, Rfl: 5    losartan (Cozaar) 25 MG tablet, Take 1 tablet by mouth Daily., Disp: 90 tablet, Rfl: 3    magnesium oxide (MAG-OX) 400 MG tablet, Take 1 tablet by mouth Daily. (Patient not taking: Reported on 7/3/2024), Disp: , Rfl:     olmesartan (BENICAR) 40 MG tablet, Take 1 tablet by mouth Daily., Disp: , Rfl:   **I did not stop or change the above medications.  Patient's medication list was updated to reflect medications they have reported as currently taking, including medication changes made by other providers.    Objective   Vital Signs:  /84   Pulse 89   Resp 20   Ht 175.3 cm (69.02\")   Wt 108 kg (239 lb)   SpO2 94%   BMI 35.28 kg/m²   Body mass index is 35.28 kg/m².    Physical Exam   Physical Exam:  GENERAL: NAD, alert  HEENT: Normocephalic, atraumatic   Resp: Even and unlabored  Extremities: No edema  Skin: Warm and dry  Psychiatric: Normal mood and affect    Neurological:   MS: AOx3, recent/remote memory intact, normal attention/concentration, language intact, no neglect  CN: visual acuity grossly normal, PERRL, EOMI, no nystagmus, no facial droop, no dysarthria  Motor: Normal tone and bulk. No tremor or abnormal movements noted.     Trapezius elevation: 5/5  Shoulder abductors 5/5  Elbow flexors 5/5  Elbow extensors 5/5   Left  2+  Right  2+  Hip flexors 5/5  Knee extensors 5/5  Hamstring strength 5/5  Dorsiflexors 5/5  Plantar flexors 5/5  Sensory: Intact to crude touch in all four ext.  Gait and station: Normal gait and station    Result Review :  The following data was reviewed by: " "Barb Orrmore, LUISITO on 07/03/2024:  Laboratory Results:         Lab Results   Component Value Date    WBC 5.59 06/14/2024    HGB 15.7 06/14/2024    HCT 46.6 06/14/2024    MCV 85.7 06/14/2024     06/14/2024     Lab Results   Component Value Date    GLUCOSE 99 06/14/2024    BUN 16 06/14/2024    CREATININE 0.85 06/14/2024    EGFRIFNONA 78 04/30/2024    EGFRIFAFRI 94 04/30/2024    BCR 18.8 06/14/2024    K 4.3 06/14/2024    CO2 27.2 06/14/2024    CALCIUM 8.9 06/14/2024    PROTENTOTREF 7.1 03/22/2024    ALBUMIN 3.9 03/22/2024    LABIL2 1.2 03/22/2024    AST 13 09/16/2021    ALT 20 09/16/2021     No results found for: \"HGBA1C\"  No results found for: \"CHOL\"  Lab Results   Component Value Date    HDL 71 04/24/2024     Lab Results   Component Value Date    LDL 67.0 04/24/2024     No results found for: \"TRIG\"  Lab Results   Component Value Date    RPR Non-Reactive 03/22/2024     Lab Results   Component Value Date    TSH 1.810 03/22/2024     Lab Results   Component Value Date    [id number] 353 04/11/2024     Data reviewed : Radiologic studies   and EMG/NCS            Assessment and Plan   Diagnoses and all orders for this visit:    1. Tarsal tunnel syndrome, right (Primary)  -     Ibuprofen 3 %, Gabapentin 10 %, Baclofen 2 %, lidocaine 4 %, Ketamine HCl 4 %; Apply 1-2 g topically to the appropriate area as directed 3 (Three) to 4 (Four) times daily.  Dispense: 90 g; Refill: 5    2. Numbness and tingling in both hands    3. Chronic neck pain    4. Numbness and tingling of both legs    5. Chronic left-sided low back pain without sciatica      Fortunately the paresthesias Elijah reported at his last visit have resolved. MRIs of cervical and lumbar spine show degenerative changes, likely causing his chronic pain and intermittent symptomology. Consider pain management referral and physical therapy if it worsens. A neuropathic compound cream has been sent in for his right ankle/foot.    We will see Elijah back in 6 " months, sooner if symptoms warrant.     LUISITO Jovel  [facility]   07/03/24       Follow Up   Return in about 6 months (around 1/3/2025).  Patient was given instructions and counseling regarding his condition or for health maintenance advice. Please see specific information pulled into the AVS if appropriate.       Dictated using Dragon Dictation

## 2024-08-26 ENCOUNTER — OFFICE VISIT (OUTPATIENT)
Dept: CARDIOLOGY | Facility: CLINIC | Age: 49
End: 2024-08-26
Payer: COMMERCIAL

## 2024-08-26 VITALS
BODY MASS INDEX: 34.07 KG/M2 | HEART RATE: 87 BPM | SYSTOLIC BLOOD PRESSURE: 105 MMHG | DIASTOLIC BLOOD PRESSURE: 73 MMHG | RESPIRATION RATE: 18 BRPM | WEIGHT: 230 LBS | HEIGHT: 69 IN

## 2024-08-26 DIAGNOSIS — I82.813 ACUTE SUPERFICIAL VENOUS THROMBOSIS OF BOTH LOWER EXTREMITIES: ICD-10-CM

## 2024-08-26 DIAGNOSIS — I10 BENIGN ESSENTIAL HTN: Primary | ICD-10-CM

## 2024-08-26 PROCEDURE — 99214 OFFICE O/P EST MOD 30 MIN: CPT | Performed by: INTERNAL MEDICINE

## 2024-08-26 NOTE — PROGRESS NOTES
Cardiology Clinic Note  Nazario Tobin MD, PhD    Subjective:     Encounter Date:08/26/2024      Patient ID: Elijah Mo is a 49 y.o. male.    Chief Complaint:  Chief Complaint   Patient presents with    Follow-up       HPI:      Previously  I the pleasure to see this 49-year-old gentleman today for history of bilateral DVT, he also has essential hypertension, last echo was in October 2019 and stress test some 10 years ago was unremarkable.  Family history of CAD with father passed away at age 52 of MI, mother with hypertension.  Medicines consist of Eliquis for DVT, on losartan and amlodipine for afterload reduction with essential hypertension.  He previously presented to the ER with right leg pain found to have DVT which appeared to be unprovoked.  2D echo revealed EF of 54 to 55% with normal global and regional function, no significant valvular abnormality other than trace to mild regurgitation as described, no valvular stenosis, normal myocardial thickness.  No history of any arrhythmia.      On repeat encounter today above information was verified, has had a further repeat DVT PE originating left lower extremity he is back on Eliquis de-escalated from treatment dose to preventative dose of 2.5 twice a day also with follow-up with hematology.  He sees vascular surgery for potential venous ablation given significant varicosities of the right lower extremity in addition to left..  Heart function was otherwise normal with no clinical heart failure. He has not had any further GI bleeding and we did discuss if he cannot tolerate anticoagulation with bilateral PEs now in an unprovoked event with needed hypercoagulable work-up then IVC filter may be indicated at the discretion of hematology risk stratification.  No chest pain shortness of breath heart failure signs or symptoms or unstable angina today.     EKG is abnormal with left anterior fascicular block sinus rhythm nonspecific ST-T wave changes    Echo  "2023 unremarkable.  Normal EF 55 to 60% with normal diastolic function, no significant valvular abnormalities, normal right left-sided pressures estimated    Review of systems negative x14 point review of systems except as mentioned above     Historical data copied forward from previous encounters in EMR is unchanged      Vitals reviewed below  Regular rate and rhythm with no rubs gallops heave or lift  No clubbing cyanosis or significant edema, varicosities present bilaterally  Normal pulses normal cap refill  Tach grossly  Skin is warm and dry  Clear to auscultation          Assessment plan per my encounter  1.  DVT now recurrent in the left leg  Anticoagulation for life recommended, continue Eliquis  Referral to hematology, has done phlebotomy in the past  Continue to follow with hematology        Blood pressure at home, recommended twice daily blood pressure logs intermittently if greater than 135 systolic routinely call clinic for restarting of antihypertensives.  Was on losartan but now off, single agent amlodipine for control at this time.  Primary prevention goals for CAD with family history  Yearly fasting lipid panels, goal LDL less than 130 optimally less than 100     No restrictions from CV standpoint at this time     No indications for ischemic evaluation with asymptomatic patient with normal EF     Back to clinic in 6 months, follow-up vascular surgery consider venous ablation.  Eliquis will continue     CV status is stable  Encourage diet and exercise as allowed by functional status per AHA guidelines    Multiple cardiac comorbidities addressed individually as above  Stable pharmacotherapy     Nazario Tobin MD, PhD    Objective:         /73 (BP Location: Right arm, Patient Position: Sitting)   Pulse 87   Resp 18   Ht 175.3 cm (69\")   Wt 104 kg (230 lb)   BMI 33.97 kg/m²           The pleasure to be involved in this patient's cardiovascular care.  Please call with any questions or " concerns  Nazario Tobin MD, PhD    Most recent EKG as reviewed and interpreted by me:  Procedures     Most recent echo as reviewed and interpreted by me:  Results for orders placed during the hospital encounter of 02/28/23    Adult Transthoracic Echo Complete W/ Color, Spectral and Contrast if Necessary Per Protocol    Interpretation Summary    Left ventricular systolic function is normal. Calculated left ventricular EF = 57.2%    Left ventricular diastolic function was normal.    Estimated right ventricular systolic pressure from tricuspid regurgitation is normal (<35 mmHg). Calculated right ventricular systolic pressure from tricuspid regurgitation is 16 mmHg.      Most recent stress test as reviewed and interpreted by me:      Most recent cardiac catheterization as reviewed interpreted by me:  No results found for this or any previous visit.    The following portions of the patient's history were reviewed and updated as appropriate: allergies, current medications, past family history, past medical history, past social history, past surgical history, and problem list.      ROS:  14 point review of systems negative except as mentioned above    Current Outpatient Medications:     allopurinol (ZYLOPRIM) 300 MG tablet, Take 1 tablet by mouth Daily., Disp: , Rfl:     amLODIPine (NORVASC) 10 MG tablet, Take 1 tablet by mouth Daily., Disp: , Rfl:     apixaban (ELIQUIS) 2.5 MG tablet tablet, Take 1 tablet by mouth 2 (Two) Times a Day., Disp: 60 tablet, Rfl: 6    Cholecalciferol (Vitamin D-3) 125 MCG (5000 UT) tablet, 40 tablets Daily., Disp: , Rfl:     fluticasone (FLONASE) 50 MCG/ACT nasal spray, 2 sprays by Each Nare route Daily., Disp: , Rfl:     montelukast (SINGULAIR) 10 MG tablet, Take 1 tablet by mouth Daily., Disp: , Rfl:     olmesartan (BENICAR) 40 MG tablet, Take 1 tablet by mouth Daily., Disp: , Rfl:     Escitalopram Oxalate (LEXAPRO PO), Take  by mouth. (Patient not taking: Reported on 7/3/2024), Disp: , Rfl:      Problem List:  Patient Active Problem List   Diagnosis    Benign essential HTN    Acute superficial venous thrombosis of lower extremity    Vitamin D deficiency    Cervical disc disorder    Arthritis     Past Medical History:  Past Medical History:   Diagnosis Date    Hypertension     Numbness and tingling      Past Surgical History:  Past Surgical History:   Procedure Laterality Date    ANKLE SURGERY      KNEE ARTHROSCOPY       Social History:  Social History     Socioeconomic History    Marital status:    Tobacco Use    Smoking status: Never    Smokeless tobacco: Never   Vaping Use    Vaping status: Never Used   Substance and Sexual Activity    Alcohol use: Yes     Comment: occasional     Drug use: Never    Sexual activity: Defer     Allergies:  No Known Allergies  Immunizations:  Immunization History   Administered Date(s) Administered    COVID-19 (MODERNA) 1st,2nd,3rd Dose Monovalent 03/16/2021, 04/13/2021    COVID-19 (MODERNA) Monovalent Original Booster 12/30/2021            In-Office Procedure(s):  No orders to display        ASCVD RIsk Score::  The 10-year ASCVD risk score (Elise RUBIN, et al., 2019) is: 1.5%    Values used to calculate the score:      Age: 49 years      Sex: Male      Is Non- : No      Diabetic: No      Tobacco smoker: No      Systolic Blood Pressure: 105 mmHg      Is BP treated: Yes      HDL Cholesterol: 71 mg/dL      Total Cholesterol: 153 mg/dL    Imaging:    Results for orders placed during the hospital encounter of 09/16/21    XR Chest 1 View    Narrative  ONE-VIEW PORTABLE CHEST    HISTORY: Chest pain.    FINDINGS:  The lungs are well-expanded and clear and the heart and hilar  structures are within normal limits. There is no acute disease.    This report was finalized on 9/16/2021 1:54 PM by Dr. Kristian Matthews M.D.       Results for orders placed during the hospital encounter of 12/16/20    CT Cardiac Calcium Score Without Dye    Narrative  CORONARY  ARTERY CALCIUM SCORING    HISTORY:  45-year-old male. Coronary artery calcium screening.    TECHNIQUE: Radiation dose reduction techniques were utilized, including  automated exposure control and exposure modulation based on body size.  Regions of interest delineated all areas of coronary artery  calcification on an ECG-gated noncontrasted cardiac CT scan.    FINDINGS: These regions were quantitatively scored for calcium as  follows:    Score 1: LM -- Calcium Score: -, Volume(mm3): -    Score 2: RCA -- Calcium Score: 0, Volume(mm3): 0    Score 3: LAD -- Calcium Score: 0, Volume(mm3): 0    Score 4: CX -- Calcium Score: 0, Volume(mm3): 0    Score 5: PDA -- Calcium Score: -, Volume(mm3): -    Score 6: Diagonal -- Calcium Score: -, Volume(mm3): -    Total: Calcium Score: 0, Volume(mm3): 0    Impression  Total coronary artery Agatston calcification score is 0.    These findings represent no identifiable coronary atherosclerotic  burden.    A score of < 10 represents only a minimal coronary atherosclerotic  burden with a negative predictive value of 90-95%.    A score of  represents a mild coronary atherosclerotic burden with  a relative risk of death of 1.6 as compared to individuals with a score  of < 10.  The risk is higher if this score is > 75% for this age group  or if there is > 1 calcified vessel.    A score of 101-400 represents a moderate coronary atherosclerotic burden  with a relative risk of death of 1.7 as compared to individuals with a  score of < 10.  The risk is higher if this score is > 75% for this age  group or if there is > 1 calcified vessel.    A score of 401-1000 represents a large coronary atherosclerotic burden  with a relative risk of death of 2.5 as compared to individuals with a  score of < 10.  The risk is higher if this score is > 75% for this age  group or if there is > 1 calcified vessel.    A score of > 1000 represents an extensive coronary atherosclerotic  burden with a relative  risk of death of 4 as compared to individuals  with a score of < 10.  The risk is higher if this score is > 75% for  this age group or if there is > 1 calcified vessel.    There are no pulmonary airspace opacities, effusions, or lymphadenopathy  within the visualized chest.    Dr. Jemma Crespo M.D reviewed the images and diagnostic data and  performed the interpretation.    This report was finalized on 12/16/2020 4:21 PM by Dr. Jemma Crespo M.D.      Results for orders placed during the hospital encounter of 12/16/20    CT Cardiac Calcium Score Without Dye    Narrative  CORONARY ARTERY CALCIUM SCORING    HISTORY:  45-year-old male. Coronary artery calcium screening.    TECHNIQUE: Radiation dose reduction techniques were utilized, including  automated exposure control and exposure modulation based on body size.  Regions of interest delineated all areas of coronary artery  calcification on an ECG-gated noncontrasted cardiac CT scan.    FINDINGS: These regions were quantitatively scored for calcium as  follows:    Score 1: LM -- Calcium Score: -, Volume(mm3): -    Score 2: RCA -- Calcium Score: 0, Volume(mm3): 0    Score 3: LAD -- Calcium Score: 0, Volume(mm3): 0    Score 4: CX -- Calcium Score: 0, Volume(mm3): 0    Score 5: PDA -- Calcium Score: -, Volume(mm3): -    Score 6: Diagonal -- Calcium Score: -, Volume(mm3): -    Total: Calcium Score: 0, Volume(mm3): 0    Impression  Total coronary artery Agatston calcification score is 0.    These findings represent no identifiable coronary atherosclerotic  burden.    A score of < 10 represents only a minimal coronary atherosclerotic  burden with a negative predictive value of 90-95%.    A score of  represents a mild coronary atherosclerotic burden with  a relative risk of death of 1.6 as compared to individuals with a score  of < 10.  The risk is higher if this score is > 75% for this age group  or if there is > 1 calcified vessel.    A score of 101-400 represents a  moderate coronary atherosclerotic burden  with a relative risk of death of 1.7 as compared to individuals with a  score of < 10.  The risk is higher if this score is > 75% for this age  group or if there is > 1 calcified vessel.    A score of 401-1000 represents a large coronary atherosclerotic burden  with a relative risk of death of 2.5 as compared to individuals with a  score of < 10.  The risk is higher if this score is > 75% for this age  group or if there is > 1 calcified vessel.    A score of > 1000 represents an extensive coronary atherosclerotic  burden with a relative risk of death of 4 as compared to individuals  with a score of < 10.  The risk is higher if this score is > 75% for  this age group or if there is > 1 calcified vessel.    There are no pulmonary airspace opacities, effusions, or lymphadenopathy  within the visualized chest.    Dr. Jemma Crespo M.D reviewed the images and diagnostic data and  performed the interpretation.    This report was finalized on 12/16/2020 4:21 PM by Dr. Jemma Crespo M.D.      Lab Review:   Admission on 06/14/2024, Discharged on 06/14/2024   Component Date Value    Right Varicosity AK Vess* 06/14/2024 1.0     Right Common Femoral Spo* 06/14/2024 Y     Right Common Femoral Com* 06/14/2024 Y     Right Common Femoral Pha* 06/14/2024 Y     Right Common Femoral Com* 06/14/2024 C     Right Common Femoral Aug* 06/14/2024 Y     Right Saphenofemoral Chris* 06/14/2024 C     Right Profunda Femoral C* 06/14/2024 C     Right Proximal Femoral C* 06/14/2024 C     Right Mid Femoral Spont 06/14/2024 Y     Right Mid Femoral Compet* 06/14/2024 Y     Right Mid Femoral Phasic 06/14/2024 Y     Right Mid Femoral Compre* 06/14/2024 C     Right Mid Femoral Augment 06/14/2024 Y     Right Distal Femoral Com* 06/14/2024 C     Right Popliteal Spont 06/14/2024 Y     Right Popliteal Competent 06/14/2024 Y     Right Popliteal Phasic 06/14/2024 Y     Right Popliteal Compress 06/14/2024 C     Right  Popliteal Augment 06/14/2024 Y     Right Posterior Tibial C* 06/14/2024 C     Right Peroneal Compress 06/14/2024 C     Right Gastronemius Compr* 06/14/2024 C     Right Greater Saph AK Co* 06/14/2024 C     Right Greater Saph BK Co* 06/14/2024 C     Right Lesser Saph Compre* 06/14/2024 C     Right Varicosity AK Comp* 06/14/2024 P     Right Varicosity AK Thro* 06/14/2024 C     Left Common Femoral Spont 06/14/2024 1.0     Left Common Femoral Spont 06/14/2024 Y     Left Common Femoral Comp* 06/14/2024 N     Left Common Femoral Phas* 06/14/2024 Y     Left Common Femoral Comp* 06/14/2024 C     Left Common Femoral Augm* 06/14/2024 Y     BH CV VAS PRELIMINARY FI* 06/14/2024 1.0     Glucose 06/14/2024 99     BUN 06/14/2024 16     Creatinine 06/14/2024 0.85     Sodium 06/14/2024 138     Potassium 06/14/2024 4.3     Chloride 06/14/2024 102     CO2 06/14/2024 27.2     Calcium 06/14/2024 8.9     BUN/Creatinine Ratio 06/14/2024 18.8     Anion Gap 06/14/2024 8.8     eGFR 06/14/2024 105.2     Protime 06/14/2024 13.2     INR 06/14/2024 0.98     WBC 06/14/2024 5.59     RBC 06/14/2024 5.44     Hemoglobin 06/14/2024 15.7     Hematocrit 06/14/2024 46.6     MCV 06/14/2024 85.7     MCH 06/14/2024 28.9     MCHC 06/14/2024 33.7     RDW 06/14/2024 13.3     RDW-SD 06/14/2024 40.6     MPV 06/14/2024 10.0     Platelets 06/14/2024 220     Neutrophil % 06/14/2024 68.5     Lymphocyte % 06/14/2024 14.3 (L)     Monocyte % 06/14/2024 13.6 (H)     Eosinophil % 06/14/2024 2.3     Basophil % 06/14/2024 0.9     Immature Grans % 06/14/2024 0.4     Neutrophils, Absolute 06/14/2024 3.83     Lymphocytes, Absolute 06/14/2024 0.80     Monocytes, Absolute 06/14/2024 0.76     Eosinophils, Absolute 06/14/2024 0.13     Basophils, Absolute 06/14/2024 0.05     Immature Grans, Absolute 06/14/2024 0.02     nRBC 06/14/2024 0.0    Lab on 03/22/2024   Component Date Value    Vitamin B-12 03/22/2024 751     Extra Tube 03/22/2024 Hold for add-ons.    Office Visit on  03/22/2024   Component Date Value    Immunofixation Result, S* 03/22/2024 Comment:     IgG 03/22/2024 937     IgA 03/22/2024 273     IgM 03/22/2024 136     Free Light Chain, [place] 03/22/2024 17.1     Free Lambda Light Chains 03/22/2024 17.0     Kappa/Lambda Ratio 03/22/2024 1.01     Total Protein 03/22/2024 7.1     Albumin 03/22/2024 3.9     Luyvz-2-Najvfypj 03/22/2024 0.3     Alpha-2-Globulin 03/22/2024 0.7     Beta Globulin 03/22/2024 1.3     Gamma Globulin 03/22/2024 0.9     M-Timothy 03/22/2024 Not Observed     Globulin 03/22/2024 3.2     A/G Ratio 03/22/2024 1.2     Please note 03/22/2024 Comment     SPE Interpretation 03/22/2024 Comment     RPR 03/22/2024 Non-Reactive     Sed Rate 03/22/2024 13     TSH 03/22/2024 1.810     Vitamin B1, Whole Blood 03/22/2024 152.0     Vitamin B6 03/22/2024 12.9     Vitamin E (Alpha Tocophe* 03/22/2024 9.5     Vitamin E (Gamma Tocophe* 03/22/2024 0.8     Lead 03/22/2024 1.9     Arsenic 03/22/2024 <1     Mercury 03/22/2024 <1.0     C-Reactive Protein 03/22/2024 0.40     Copper 03/22/2024 120     IgA 03/22/2024 268     Gliadin Deamidated Pepti* 03/22/2024 3     Tissue Transglutaminase * 03/22/2024 <2      Recent labs reviewed and interpreted for clinical significance and application            Level of Care:           Nazario Tobin MD  08/26/24  .

## 2024-09-06 NOTE — PROGRESS NOTES
"Chief Complaint:  follow up on venous scan; superficial phlebitis  No chief complaint on file.    Subjective        Elijah Mo presents to [facility]ERY  History of Present IllnessThe patient is a 49-year-old gentleman with previous bilateral DVTs with pulmonary embolism, on chronic anticoagulation, with right leg pain and varicose veins.  He was seen in the emergency room on June 14, 2024 and a venous scan demonstrated chronic superficial thrombophlebitic changes noted in  varices above the knee.  Deep venous insufficiency noted in the common femoral vein as well.  He has right leg swelling which worsens as the day progresses.  He wears compression stockings with no significant benefit.  He is having more pain in his varices.  His Eliquis was decreased from 5 mg twice a day to 2.5 mg twice a day.  He returned on September 9, 2024 with a venous scan with reflux and mapping.  This demonstrated deep venous insufficiency with significant reflux at the saphenofemoral junction and involving the greater saphenous vein and anterior accessory saphenous vein.  Chronic thrombotic changes noted in distal anterior accessory saphenous vein varicosities and in the short saphenous vein.    Past History:  Medical History: has a past medical history of Hypertension and Numbness and tingling.   Surgical History: has a past surgical history that includes Ankle surgery and Knee Arthroscopy.   Family History: family history includes Heart attack in his father.   Social History: reports that he has never smoked. He has never used smokeless tobacco. He reports current alcohol use. He reports that he does not use drugs.    (Not in a hospital admission)     Allergies: Patient has no known allergies.   Objective   Vital Signs:  There were no vitals taken for this visit.  Estimated body mass index is 33.97 kg/m² as calculated from the following:    Height as of 8/26/24: 175.3 cm (69\").    Weight as of " 8/26/24: 104 kg (230 lb).     BMI is >= 30 and <35. (Class 1 Obesity). The following options were offered after discussion;: Nothing needed    Elijah Mo  reports that he has never smoked. He has never used smokeless tobacco.  Physical Exam large tortuous varices in the right anteromedial thigh beginning high thigh and coursing down to below the level of the knee. These of varices are as large as 1.5 cm in diameter. Multiple clusters of spider telangiectasia below the knee with 1+ edema. No edema on the left.   Result Review :        Data reviewed : Venous scan with reflux and mapping from September 11, 2024             Assessment and Plan     Diagnoses and all orders for this visit:    1. Superficial thrombophlebitis of right leg (Primary)    2. Varicose veins of right lower extremity with other complications    3. Venous (peripheral) insufficiency    4. Chronic anticoagulation    He has both deep and superficial venous insufficiency with severe reflux noted at the saphenofemoral junction, involving the greater saphenous vein in the anterior accessory saphenous veins.  With his superficial thrombophlebitis and branches off of the anterior accessory saphenous vein I do believe he could benefit significantly from ablations of the greater saphenous vein and anterior accessory saphenous vein.  The ablation procedure, laser ablation and chemical ablation were discussed in detail with him, including the risk and benefits of each of these procedures.  Primary benefit would be to decompress his varices but also significantly diminish the chance of recurrent superficial thrombophlebitis.  He understands and agrees.  We will contact insurance for approval.         Follow Up     No follow-ups on file.  Patient was given instructions and counseling regarding his condition or for health maintenance advice. Please see specific information pulled into the AVS if appropriate.

## 2024-09-09 ENCOUNTER — HOSPITAL ENCOUNTER (OUTPATIENT)
Facility: HOSPITAL | Age: 49
Discharge: HOME | End: 2024-09-09
Admitting: SURGERY
Payer: COMMERCIAL

## 2024-09-09 DIAGNOSIS — I80.01 SUPERFICIAL THROMBOPHLEBITIS OF RIGHT LEG: ICD-10-CM

## 2024-09-09 DIAGNOSIS — I83.891 VARICOSE VEINS OF RIGHT LOWER EXTREMITY WITH OTHER COMPLICATIONS: ICD-10-CM

## 2024-09-09 DIAGNOSIS — I87.2 VENOUS (PERIPHERAL) INSUFFICIENCY: ICD-10-CM

## 2024-09-09 LAB
BH CV LOW VAS RIGHT AA GSV VESSEL: 1
BH CV LOW VAS RIGHT GSV MID CALF VESSEL: 1
BH CV LOW VAS RIGHT LESSER SAPH VESSEL: 1
BH CV LOW VAS RIGHT VARICOSITY AK VESSEL: 1
BH CV LOWER VAS RIGHT GSV DIST THIGH COMPRESSIBILTY: NORMAL
BH CV LOWER VAS RIGHT GSV MID CALF COMPRESSIBILTY: NORMAL
BH CV LOWER VAS RIGHT GSV MID THIGH COMPRESSIBILTY: NORMAL
BH CV LOWER VASCULAR LEFT COMMON FEMORAL AUGMENT: NORMAL
BH CV LOWER VASCULAR LEFT COMMON FEMORAL COMPETENT: NORMAL
BH CV LOWER VASCULAR LEFT COMMON FEMORAL COMPRESS: NORMAL
BH CV LOWER VASCULAR LEFT COMMON FEMORAL PHASIC: NORMAL
BH CV LOWER VASCULAR LEFT COMMON FEMORAL SPONT: NORMAL
BH CV LOWER VASCULAR RIGHT AA GSV COMPETENT: NORMAL
BH CV LOWER VASCULAR RIGHT AA GSV COMPRESS: NORMAL
BH CV LOWER VASCULAR RIGHT COMMON FEMORAL AUGMENT: NORMAL
BH CV LOWER VASCULAR RIGHT COMMON FEMORAL COMPETENT: NORMAL
BH CV LOWER VASCULAR RIGHT COMMON FEMORAL COMPRESS: NORMAL
BH CV LOWER VASCULAR RIGHT COMMON FEMORAL PHASIC: NORMAL
BH CV LOWER VASCULAR RIGHT COMMON FEMORAL SPONT: NORMAL
BH CV LOWER VASCULAR RIGHT DISTAL FEMORAL COMPRESS: NORMAL
BH CV LOWER VASCULAR RIGHT EXTERNAL ILIAC AUGMENT: NORMAL
BH CV LOWER VASCULAR RIGHT EXTERNAL ILIAC COMPETENT: NORMAL
BH CV LOWER VASCULAR RIGHT EXTERNAL ILIAC COMPRESS: NORMAL
BH CV LOWER VASCULAR RIGHT EXTERNAL ILIAC PHASIC: NORMAL
BH CV LOWER VASCULAR RIGHT EXTERNAL ILIAC SPONT: NORMAL
BH CV LOWER VASCULAR RIGHT GASTRONEMIUS COMPRESS: NORMAL
BH CV LOWER VASCULAR RIGHT GREATER SAPH AK COMPETENT: NORMAL
BH CV LOWER VASCULAR RIGHT GREATER SAPH BK COMPRESS: NORMAL
BH CV LOWER VASCULAR RIGHT GSV DIST THIGH COMPETENT: NORMAL
BH CV LOWER VASCULAR RIGHT GSV MID CALF THROMBUS: NORMAL
BH CV LOWER VASCULAR RIGHT LESSER SAPH COMPRESS: NORMAL
BH CV LOWER VASCULAR RIGHT LESSER SAPH THROMBUS: NORMAL
BH CV LOWER VASCULAR RIGHT MID FEMORAL AUGMENT: NORMAL
BH CV LOWER VASCULAR RIGHT MID FEMORAL COMPETENT: NORMAL
BH CV LOWER VASCULAR RIGHT MID FEMORAL COMPRESS: NORMAL
BH CV LOWER VASCULAR RIGHT PERONEAL AUGMENT: NORMAL
BH CV LOWER VASCULAR RIGHT PERONEAL COMPRESS: NORMAL
BH CV LOWER VASCULAR RIGHT POPLITEAL AUGMENT: NORMAL
BH CV LOWER VASCULAR RIGHT POPLITEAL COMPETENT: NORMAL
BH CV LOWER VASCULAR RIGHT POPLITEAL COMPRESS: NORMAL
BH CV LOWER VASCULAR RIGHT POSTERIOR TIBIAL AUGMENT: NORMAL
BH CV LOWER VASCULAR RIGHT POSTERIOR TIBIAL COMPRESS: NORMAL
BH CV LOWER VASCULAR RIGHT PROFUNDA FEMORAL AUGMENT: NORMAL
BH CV LOWER VASCULAR RIGHT PROFUNDA FEMORAL COMPETENT: NORMAL
BH CV LOWER VASCULAR RIGHT PROFUNDA FEMORAL COMPRESS: NORMAL
BH CV LOWER VASCULAR RIGHT PROFUNDA FEMORAL PHASIC: NORMAL
BH CV LOWER VASCULAR RIGHT PROFUNDA FEMORAL SPONT: NORMAL
BH CV LOWER VASCULAR RIGHT PROXIMAL FEMORAL AUGMENT: NORMAL
BH CV LOWER VASCULAR RIGHT PROXIMAL FEMORAL COMPETENT: NORMAL
BH CV LOWER VASCULAR RIGHT PROXIMAL FEMORAL COMPRESS: NORMAL
BH CV LOWER VASCULAR RIGHT PROXIMAL FEMORAL PHASIC: NORMAL
BH CV LOWER VASCULAR RIGHT PROXIMAL FEMORAL SPONT: NORMAL
BH CV LOWER VASCULAR RIGHT SAPHENOFEMORAL JUNCTION AUGMENT: NORMAL
BH CV LOWER VASCULAR RIGHT SAPHENOFEMORAL JUNCTION COMPETENT: NORMAL
BH CV LOWER VASCULAR RIGHT SAPHENOFEMORAL JUNCTION COMPRESS: NORMAL
BH CV LOWER VASCULAR RIGHT SAPHENOFEMORAL JUNCTION PHASIC: NORMAL
BH CV LOWER VASCULAR RIGHT SAPHENOFEMORAL JUNCTION SPONT: NORMAL
BH CV LOWER VASCULAR RIGHT SOLEAL COMPRESS: NORMAL
BH CV LOWER VASCULAR RIGHT SSV MID CALF COMPRESS: NORMAL
BH CV LOWER VASCULAR RIGHT SSV MID CALF THROMBUS: NORMAL
BH CV LOWER VASCULAR RIGHT SSV MID CALF VESSEL: 1
BH CV LOWER VASCULAR RIGHT VARICOSITY AK COMPETENT: NORMAL
BH CV LOWER VASCULAR RIGHT VARICOSITY AK COMPRESS: NORMAL
BH CV LOWER VASCULAR RIGHT VARICOSITY AK THROMBUS: NORMAL
BH CV LOWER VASCULAR RIGHT VARICOSITY BK COMPETENT: NORMAL
BH CV LOWER VASCULAR RIGHT VARICOSITY BK COMPRESS: NORMAL
BH CV RIGHT LOWER VAS AA GSV REFLUX TIME: 3.81 SEC
BH CV RIGHT LOWER VAS AA GSV TRANS DIAMETER: 1.01 CM
BH CV RIGHT LOWER VAS COMMON FEMORAL REFLUX COLOR FLOW TIME: 2.7 SEC
BH CV RIGHT LOWER VAS EXT ILIAC REFLUX COLOR FLOW TIME: 3.23 SEC
BH CV RIGHT LOWER VAS GSV KNEE REFLUX TIME: 2.8 SEC
BH CV RIGHT LOWER VAS GSV KNEE TRANS DIAMETER: 0.66 CM
BH CV RIGHT LOWER VAS GSV PROX CALF TRANS DIAMETER: 0.43 CM
BH CV RIGHT LOWER VAS GSV PROX THIGH REFLUX TIME: 2.2 SEC
BH CV RIGHT LOWER VAS GSV PROX THIGH TRANS DIAMETER: 0.61 CM
BH CV RIGHT LOWER VAS PROX FV REFLUX COLOR FLOW TIME: 2.99 SEC
BH CV RIGHT LOWER VAS SAPHENOFEM JUNCTION REFLUX TIME: 2.65 SEC
BH CV RIGHT LOWER VAS SAPHENOFEM JUNCTION TRANSVERSE DIAMETER: 0.76 CM
BH CV RIGHT LOWER VAS VARICOSITY AK TRANS DIAMETER: 0.72 CM
BH CV RIGHT LOWER VAS VARICOSITY BK REFLUX TIME: 2.38 SEC
BH CV RIGHT LOWER VAS VARICOSITY BK TRANS DIAMETER: 0.42 CM
BH CV VAS RIGHT GSV PROXIMAL HIDDEN LRR COMPRESSIBILTY: NORMAL
Lab: NORMAL

## 2024-09-09 PROCEDURE — 93971 EXTREMITY STUDY: CPT | Performed by: SURGERY

## 2024-09-09 PROCEDURE — 93971 EXTREMITY STUDY: CPT

## 2024-09-11 ENCOUNTER — OFFICE VISIT (OUTPATIENT)
Age: 49
End: 2024-09-11
Payer: COMMERCIAL

## 2024-09-11 VITALS
BODY MASS INDEX: 34.07 KG/M2 | SYSTOLIC BLOOD PRESSURE: 106 MMHG | DIASTOLIC BLOOD PRESSURE: 72 MMHG | HEIGHT: 69 IN | WEIGHT: 230 LBS

## 2024-09-11 DIAGNOSIS — I83.891 VARICOSE VEINS OF RIGHT LOWER EXTREMITY WITH OTHER COMPLICATIONS: ICD-10-CM

## 2024-09-11 DIAGNOSIS — I87.2 VENOUS (PERIPHERAL) INSUFFICIENCY: ICD-10-CM

## 2024-09-11 DIAGNOSIS — I80.01 SUPERFICIAL THROMBOPHLEBITIS OF RIGHT LEG: Primary | ICD-10-CM

## 2024-09-11 DIAGNOSIS — Z79.01 CHRONIC ANTICOAGULATION: ICD-10-CM

## 2024-09-11 PROCEDURE — 99214 OFFICE O/P EST MOD 30 MIN: CPT | Performed by: SURGERY

## 2025-02-07 ENCOUNTER — OFFICE VISIT (OUTPATIENT)
Dept: NEUROLOGY | Facility: CLINIC | Age: 50
End: 2025-02-07
Payer: COMMERCIAL

## 2025-02-07 VITALS
WEIGHT: 253 LBS | DIASTOLIC BLOOD PRESSURE: 80 MMHG | SYSTOLIC BLOOD PRESSURE: 128 MMHG | HEART RATE: 96 BPM | OXYGEN SATURATION: 99 % | BODY MASS INDEX: 37.47 KG/M2 | HEIGHT: 69 IN

## 2025-02-07 DIAGNOSIS — M54.50 CHRONIC LEFT-SIDED LOW BACK PAIN WITHOUT SCIATICA: ICD-10-CM

## 2025-02-07 DIAGNOSIS — R20.2 NUMBNESS AND TINGLING IN BOTH HANDS: Primary | ICD-10-CM

## 2025-02-07 DIAGNOSIS — G57.51 TARSAL TUNNEL SYNDROME, RIGHT: ICD-10-CM

## 2025-02-07 DIAGNOSIS — R20.2 NUMBNESS AND TINGLING OF BOTH LEGS: ICD-10-CM

## 2025-02-07 DIAGNOSIS — R20.0 NUMBNESS AND TINGLING IN BOTH HANDS: Primary | ICD-10-CM

## 2025-02-07 DIAGNOSIS — G89.29 CHRONIC LEFT-SIDED LOW BACK PAIN WITHOUT SCIATICA: ICD-10-CM

## 2025-02-07 DIAGNOSIS — R20.0 NUMBNESS AND TINGLING OF BOTH LEGS: ICD-10-CM

## 2025-02-07 DIAGNOSIS — E53.8 B12 DEFICIENCY: ICD-10-CM

## 2025-02-07 PROBLEM — I82.409 DEEP VENOUS THROMBOSIS OF LOWER EXTREMITY: Status: ACTIVE | Noted: 2023-10-20

## 2025-02-07 LAB — VIT B12 SERPL-MCNC: 629 PG/ML (ref 211–946)
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
## 2025-02-14 DIAGNOSIS — G89.29 CHRONIC LEFT-SIDED LOW BACK PAIN WITHOUT SCIATICA: ICD-10-CM

## 2025-02-14 DIAGNOSIS — R20.0 NUMBNESS AND TINGLING OF BOTH LEGS: ICD-10-CM

## 2025-02-14 DIAGNOSIS — G89.29 CHRONIC NECK PAIN: ICD-10-CM

## 2025-02-14 DIAGNOSIS — R20.2 NUMBNESS AND TINGLING IN BOTH HANDS: Primary | ICD-10-CM

## 2025-02-14 DIAGNOSIS — R20.0 NUMBNESS AND TINGLING IN BOTH HANDS: Primary | ICD-10-CM

## 2025-02-14 DIAGNOSIS — M54.2 CHRONIC NECK PAIN: ICD-10-CM

## 2025-02-14 DIAGNOSIS — M54.50 CHRONIC LEFT-SIDED LOW BACK PAIN WITHOUT SCIATICA: ICD-10-CM

## 2025-02-14 DIAGNOSIS — G57.51 TARSAL TUNNEL SYNDROME, RIGHT: ICD-10-CM

## 2025-02-14 DIAGNOSIS — R20.2 NUMBNESS AND TINGLING OF BOTH LEGS: ICD-10-CM

## 2025-02-14 NOTE — TELEPHONE ENCOUNTER
PATIENT CALLING ABOUT COMPOUND CREME HE IS TRYING TO GET A REFILL OF.  THIS MEDICATION IS ON PT CANCELED MEDICATION LIST.    PLEASE ADVISE PATIENT

## 2025-04-10 DIAGNOSIS — R20.2 NUMBNESS AND TINGLING IN BOTH HANDS: ICD-10-CM

## 2025-04-10 DIAGNOSIS — R20.0 NUMBNESS AND TINGLING OF BOTH LEGS: ICD-10-CM

## 2025-04-10 DIAGNOSIS — M54.2 CHRONIC NECK PAIN: ICD-10-CM

## 2025-04-10 DIAGNOSIS — G89.29 CHRONIC NECK PAIN: ICD-10-CM

## 2025-04-10 DIAGNOSIS — G57.51 TARSAL TUNNEL SYNDROME, RIGHT: ICD-10-CM

## 2025-04-10 DIAGNOSIS — M54.50 CHRONIC LEFT-SIDED LOW BACK PAIN WITHOUT SCIATICA: ICD-10-CM

## 2025-04-10 DIAGNOSIS — R20.2 NUMBNESS AND TINGLING OF BOTH LEGS: ICD-10-CM

## 2025-04-10 DIAGNOSIS — R20.0 NUMBNESS AND TINGLING IN BOTH HANDS: ICD-10-CM

## 2025-04-10 DIAGNOSIS — G89.29 CHRONIC LEFT-SIDED LOW BACK PAIN WITHOUT SCIATICA: ICD-10-CM

## 2025-06-09 DIAGNOSIS — G89.29 CHRONIC LEFT-SIDED LOW BACK PAIN WITHOUT SCIATICA: ICD-10-CM

## 2025-06-09 DIAGNOSIS — R20.0 NUMBNESS AND TINGLING IN BOTH HANDS: ICD-10-CM

## 2025-06-09 DIAGNOSIS — R20.2 NUMBNESS AND TINGLING IN BOTH HANDS: ICD-10-CM

## 2025-06-09 DIAGNOSIS — R20.0 NUMBNESS AND TINGLING OF BOTH LEGS: ICD-10-CM

## 2025-06-09 DIAGNOSIS — G89.29 CHRONIC NECK PAIN: ICD-10-CM

## 2025-06-09 DIAGNOSIS — M54.50 CHRONIC LEFT-SIDED LOW BACK PAIN WITHOUT SCIATICA: ICD-10-CM

## 2025-06-09 DIAGNOSIS — M54.2 CHRONIC NECK PAIN: ICD-10-CM

## 2025-06-09 DIAGNOSIS — G57.51 TARSAL TUNNEL SYNDROME, RIGHT: ICD-10-CM

## 2025-06-09 DIAGNOSIS — R20.2 NUMBNESS AND TINGLING OF BOTH LEGS: ICD-10-CM

## 2025-06-27 ENCOUNTER — TELEPHONE (OUTPATIENT)
Dept: NEUROLOGY | Facility: CLINIC | Age: 50
End: 2025-06-27
Payer: COMMERCIAL

## 2025-06-27 DIAGNOSIS — R20.0 NUMBNESS AND TINGLING IN BOTH HANDS: ICD-10-CM

## 2025-06-27 DIAGNOSIS — R20.2 NUMBNESS AND TINGLING OF BOTH LEGS: ICD-10-CM

## 2025-06-27 DIAGNOSIS — G57.51 TARSAL TUNNEL SYNDROME, RIGHT: ICD-10-CM

## 2025-06-27 DIAGNOSIS — G89.29 CHRONIC LEFT-SIDED LOW BACK PAIN WITHOUT SCIATICA: ICD-10-CM

## 2025-06-27 DIAGNOSIS — M54.50 CHRONIC LEFT-SIDED LOW BACK PAIN WITHOUT SCIATICA: ICD-10-CM

## 2025-06-27 DIAGNOSIS — M54.2 CHRONIC NECK PAIN: ICD-10-CM

## 2025-06-27 DIAGNOSIS — R20.2 NUMBNESS AND TINGLING IN BOTH HANDS: ICD-10-CM

## 2025-06-27 DIAGNOSIS — R20.0 NUMBNESS AND TINGLING OF BOTH LEGS: ICD-10-CM

## 2025-06-27 DIAGNOSIS — G89.29 CHRONIC NECK PAIN: ICD-10-CM

## 2025-06-27 NOTE — TELEPHONE ENCOUNTER
Caller: Elijah Mo    Relationship: Self    Best call back number:   Telephone Information:   Mobile [phone]         Which medication are you concerned about: Ibuprofen 3 %, Gabapentin 10 %, Baclofen 2 %, lidocaine 4 %, Ketamine HCl 4 %     Who prescribed you this medication:     What are your concerns: PT NEEDS THIS REROUTED TO THE FOLLOWING PHARMACY PLEASE, HE IS ALMOST OUT OF THE COMPOUND CREAM     [facility] - [address]  - [phone] [facility] [phone] FX

## 2025-08-08 ENCOUNTER — OFFICE VISIT (OUTPATIENT)
Dept: NEUROLOGY | Facility: CLINIC | Age: 50
End: 2025-08-08
Payer: COMMERCIAL

## 2025-08-08 VITALS
HEART RATE: 79 BPM | OXYGEN SATURATION: 97 % | BODY MASS INDEX: 34.8 KG/M2 | DIASTOLIC BLOOD PRESSURE: 82 MMHG | WEIGHT: 235 LBS | SYSTOLIC BLOOD PRESSURE: 126 MMHG | HEIGHT: 69 IN

## 2025-08-08 DIAGNOSIS — M79.2 CHRONIC NEUROPATHIC PAIN: Primary | ICD-10-CM

## 2025-08-08 DIAGNOSIS — R20.2 NUMBNESS AND TINGLING IN BOTH HANDS: ICD-10-CM

## 2025-08-08 DIAGNOSIS — G89.29 CHRONIC NEUROPATHIC PAIN: Primary | ICD-10-CM

## 2025-08-08 DIAGNOSIS — G57.53 TARSAL TUNNEL SYNDROME OF BOTH LOWER EXTREMITIES: ICD-10-CM

## 2025-08-08 DIAGNOSIS — R20.0 NUMBNESS AND TINGLING IN BOTH HANDS: ICD-10-CM

## 2025-08-08 PROCEDURE — 99214 OFFICE O/P EST MOD 30 MIN: CPT | Performed by: PSYCHIATRY & NEUROLOGY

## 2025-08-08 RX ORDER — PREGABALIN 100 MG/1
100 CAPSULE ORAL 2 TIMES DAILY
Qty: 60 CAPSULE | Refills: 1 | Status: SHIPPED | OUTPATIENT
Start: 2025-08-08 | End: 2025-09-07

## 2025-08-08 RX ORDER — DIPHENOXYLATE HYDROCHLORIDE AND ATROPINE SULFATE 2.5; .025 MG/1; MG/1
1 TABLET ORAL DAILY
COMMUNITY

## 2025-08-08 RX ORDER — PREGABALIN 75 MG/1
75 CAPSULE ORAL 2 TIMES DAILY
Qty: 28 CAPSULE | Refills: 0 | Status: SHIPPED | OUTPATIENT
Start: 2025-08-08 | End: 2025-08-22

## 2025-08-08 RX ORDER — GABAPENTIN 300 MG/1
300 CAPSULE ORAL 2 TIMES DAILY
COMMUNITY
Start: 2025-04-08 | End: 2025-08-08